# Patient Record
Sex: FEMALE | Race: WHITE | NOT HISPANIC OR LATINO | URBAN - METROPOLITAN AREA
[De-identification: names, ages, dates, MRNs, and addresses within clinical notes are randomized per-mention and may not be internally consistent; named-entity substitution may affect disease eponyms.]

---

## 2019-02-12 ENCOUNTER — TRANSFERRED RECORDS (OUTPATIENT)
Dept: MULTI SPECIALTY CLINIC | Facility: CLINIC | Age: 49
End: 2019-02-12

## 2024-03-05 ENCOUNTER — APPOINTMENT (OUTPATIENT)
Dept: ULTRASOUND IMAGING | Facility: CLINIC | Age: 54
End: 2024-03-05
Attending: EMERGENCY MEDICINE
Payer: COMMERCIAL

## 2024-03-05 ENCOUNTER — HOSPITAL ENCOUNTER (EMERGENCY)
Facility: CLINIC | Age: 54
Discharge: HOME | End: 2024-03-05
Attending: EMERGENCY MEDICINE | Admitting: EMERGENCY MEDICINE
Payer: COMMERCIAL

## 2024-03-05 ENCOUNTER — APPOINTMENT (OUTPATIENT)
Dept: RADIOLOGY | Facility: CLINIC | Age: 54
End: 2024-03-05
Attending: EMERGENCY MEDICINE
Payer: COMMERCIAL

## 2024-03-05 VITALS
SYSTOLIC BLOOD PRESSURE: 169 MMHG | DIASTOLIC BLOOD PRESSURE: 94 MMHG | TEMPERATURE: 97.2 F | OXYGEN SATURATION: 97 % | HEART RATE: 95 BPM | WEIGHT: 253 LBS | RESPIRATION RATE: 20 BRPM

## 2024-03-05 DIAGNOSIS — R07.89 RIGHT-SIDED CHEST WALL PAIN: ICD-10-CM

## 2024-03-05 LAB
ALBUMIN SERPL BCG-MCNC: 4.5 G/DL (ref 3.5–5.2)
ALP SERPL-CCNC: 91 U/L (ref 40–150)
ALT SERPL W P-5'-P-CCNC: 36 U/L (ref 0–50)
ANION GAP SERPL CALCULATED.3IONS-SCNC: 13 MMOL/L (ref 7–15)
AST SERPL W P-5'-P-CCNC: 22 U/L (ref 0–45)
ATRIAL RATE - MUSE: 78 BPM
BASOPHILS # BLD AUTO: 0.1 10E3/UL (ref 0–0.2)
BASOPHILS NFR BLD AUTO: 1 %
BILIRUB SERPL-MCNC: 0.3 MG/DL
BUN SERPL-MCNC: 14.4 MG/DL (ref 6–20)
CALCIUM SERPL-MCNC: 9.8 MG/DL (ref 8.6–10)
CHLORIDE SERPL-SCNC: 99 MMOL/L (ref 98–107)
CREAT SERPL-MCNC: 0.93 MG/DL (ref 0.51–0.95)
DEPRECATED HCO3 PLAS-SCNC: 26 MMOL/L (ref 22–29)
DIASTOLIC BLOOD PRESSURE - MUSE: NORMAL MMHG
EGFRCR SERPLBLD CKD-EPI 2021: 73 ML/MIN/1.73M2
EOSINOPHIL # BLD AUTO: 0.2 10E3/UL (ref 0–0.7)
EOSINOPHIL NFR BLD AUTO: 2 %
ERYTHROCYTE [DISTWIDTH] IN BLOOD BY AUTOMATED COUNT: 13.2 % (ref 10–15)
GLUCOSE SERPL-MCNC: 97 MG/DL (ref 70–99)
HCT VFR BLD AUTO: 40.6 % (ref 35–47)
HGB BLD-MCNC: 13.4 G/DL (ref 11.7–15.7)
IMM GRANULOCYTES # BLD: 0 10E3/UL
IMM GRANULOCYTES NFR BLD: 0 %
INTERPRETATION ECG - MUSE: NORMAL
LIPASE SERPL-CCNC: 42 U/L (ref 13–60)
LYMPHOCYTES # BLD AUTO: 2.8 10E3/UL (ref 0–5.3)
LYMPHOCYTES NFR BLD AUTO: 29 %
MCH RBC QN AUTO: 30.3 PG (ref 26.5–33)
MCHC RBC AUTO-ENTMCNC: 33 G/DL (ref 31.5–36.5)
MCV RBC AUTO: 92 FL (ref 78–100)
MONOCYTES # BLD AUTO: 0.6 10E3/UL (ref 0–1.3)
MONOCYTES NFR BLD AUTO: 6 %
NEUTROPHILS # BLD AUTO: 5.9 10E3/UL (ref 1.6–8.3)
NEUTROPHILS NFR BLD AUTO: 61 %
NRBC # BLD AUTO: 0 10E3/UL
NRBC BLD AUTO-RTO: 0 /100
P AXIS - MUSE: 14 DEGREES
PLATELET # BLD AUTO: 321 10E3/UL (ref 150–450)
POTASSIUM SERPL-SCNC: 4.6 MMOL/L (ref 3.4–5.3)
PR INTERVAL - MUSE: 172 MS
PROT SERPL-MCNC: 8.7 G/DL (ref 6.4–8.3)
QRS DURATION - MUSE: 80 MS
QT - MUSE: 370 MS
QTC - MUSE: 421 MS
R AXIS - MUSE: 12 DEGREES
RBC # BLD AUTO: 4.42 10E6/UL (ref 3.8–5.2)
SODIUM SERPL-SCNC: 138 MMOL/L (ref 135–145)
SYSTOLIC BLOOD PRESSURE - MUSE: NORMAL MMHG
T AXIS - MUSE: 32 DEGREES
VENTRICULAR RATE- MUSE: 78 BPM
WBC # BLD AUTO: 9.6 10E3/UL (ref 4–11)

## 2024-03-05 PROCEDURE — 96372 THER/PROPH/DIAG INJ SC/IM: CPT | Performed by: EMERGENCY MEDICINE

## 2024-03-05 PROCEDURE — 250N000011 HC RX IP 250 OP 636: Performed by: EMERGENCY MEDICINE

## 2024-03-05 PROCEDURE — 83690 ASSAY OF LIPASE: CPT | Performed by: EMERGENCY MEDICINE

## 2024-03-05 PROCEDURE — 80053 COMPREHEN METABOLIC PANEL: CPT | Performed by: EMERGENCY MEDICINE

## 2024-03-05 PROCEDURE — 76705 ECHO EXAM OF ABDOMEN: CPT

## 2024-03-05 PROCEDURE — 36415 COLL VENOUS BLD VENIPUNCTURE: CPT | Performed by: EMERGENCY MEDICINE

## 2024-03-05 PROCEDURE — 71101 X-RAY EXAM UNILAT RIBS/CHEST: CPT | Mod: RT

## 2024-03-05 PROCEDURE — 85025 COMPLETE CBC W/AUTO DIFF WBC: CPT | Performed by: EMERGENCY MEDICINE

## 2024-03-05 PROCEDURE — 93005 ELECTROCARDIOGRAM TRACING: CPT | Performed by: EMERGENCY MEDICINE

## 2024-03-05 PROCEDURE — 99285 EMERGENCY DEPT VISIT HI MDM: CPT | Mod: 25

## 2024-03-05 RX ORDER — KETOROLAC TROMETHAMINE 30 MG/ML
30 INJECTION, SOLUTION INTRAMUSCULAR; INTRAVENOUS ONCE
Status: COMPLETED | OUTPATIENT
Start: 2024-03-05 | End: 2024-03-05

## 2024-03-05 RX ADMIN — KETOROLAC TROMETHAMINE 30 MG: 30 INJECTION, SOLUTION INTRAMUSCULAR at 20:14

## 2024-03-05 ASSESSMENT — ENCOUNTER SYMPTOMS
FEVER: 0
NAUSEA: 0
VOMITING: 0
BLOOD IN STOOL: 0
BACK PAIN: 1
CHILLS: 0

## 2024-03-05 ASSESSMENT — COLUMBIA-SUICIDE SEVERITY RATING SCALE - C-SSRS
6. HAVE YOU EVER DONE ANYTHING, STARTED TO DO ANYTHING, OR PREPARED TO DO ANYTHING TO END YOUR LIFE?: NO
1. IN THE PAST MONTH, HAVE YOU WISHED YOU WERE DEAD OR WISHED YOU COULD GO TO SLEEP AND NOT WAKE UP?: NO
2. HAVE YOU ACTUALLY HAD ANY THOUGHTS OF KILLING YOURSELF IN THE PAST MONTH?: NO

## 2024-03-05 NOTE — ED PROVIDER NOTES
EMERGENCY DEPARTMENT ENCOUNTER      NAME: Mariela Doyle  AGE: 53 year old female  YOB: 1970  MRN: [id number]  EVALUATION DATE & TIME: No admission date for patient encounter.    PCP: No Ref-Primary, Physician    ED PROVIDER: Donta Gibbons DO      Chief Complaint   Patient presents with    Abdominal Pain         FINAL IMPRESSION:  1. Right-sided chest wall pain          ED COURSE & MEDICAL DECISION MAKIN-year-old female presented to the ED for evaluation right sided chest wall pain started in the middle of the night.  Upon arrival to the ED the patient was hypertensive.  She was otherwise hemodynamically stable.  The patient did not appear to be in any obvious distress or discomfort at the time of her initial evaluation.  On exam the patient's pain was clearly reproducible to palpation over the right anterolateral lower ribs.  The remainder of her physical exam was unremarkable.      CBC, CMP, and lipase were reassuring.  Right upper quadrant abdominal ultrasound was nondiagnostic.  EKG revealed normal sinus rhythm without any concerning ST or T wave changes.  Chest x-ray was nondiagnostic.    The patient was reevaluated and informed of the reassuring lab and imaging results.  The patient was informed that her symptoms are likely due to a musculoskeletal etiology such as costochondritis.  The patient was given a dose of Toradol here in the ED to treat her pain.  Following this the patient felt comfortable returning home.  The patient was instructed to continue using over-the-counter ibuprofen as needed for any further pain.  The patient was instructed to follow-up with her primary care provider for reevaluation or to return back to ED sooner for any worsening pain or any other new or concerning symptoms.    Pertinent Labs & Imaging studies reviewed. (See chart for details)  5:01 PM I met with the patient to gather history and to perform my initial exam. We discussed plans for the ED course,  including diagnostic testing and treatment.         At the conclusion of the encounter I discussed the results of all of the tests and the disposition. The questions were answered. The patient or family acknowledged understanding and was agreeable with the care plan.     Medical Decision Making  Obtained supplemental history:Supplemental history obtained?: Documented in chart  Reviewed external records: External records reviewed?: Documented in chart  Care impacted by chronic illness:Other: GERD  Care significantly affected by social determinants of health:N/A  Did you consider but not order tests?: Work up considered but not performed and documented in chart, if applicable  Did you interpret images independently?: Independent interpretation of ECG and images noted in documentation, when applicable.  Consultation discussion with other provider:Did you involve another provider (consultant, , pharmacy, etc.)?: No  Discharge. No recommendations on prescription strength medication(s). See documentation for any additional details.      PPE worn: n95 mask, goggles    MEDICATIONS GIVEN IN THE EMERGENCY:  Medications   ketorolac (TORADOL) injection 30 mg (30 mg Intramuscular $Given 3/5/24 2014)       NEW PRESCRIPTIONS STARTED AT TODAY'S ER VISIT  There are no discharge medications for this patient.         =================================================================    HPI    Patient information was obtained from: The patient    Use of : N/A         Mariela Doyle is a 53 year old female with a pertinent history of GERD who presents to this ED via walk-in for evaluation of abdominal pain.    The patient is complaining of sharp right sided rib pain that radiates to the right-side of her back for about a week now. Since onset, her pain has progressively worsened with today being the worst. She rates her pain a 7/10. She was able to eat a salad today around noon. She had a normal bowel movement this  morning. She endorses a headache.    She reports having a family history of colon cancer. She smokes marijuana and drinks alcohol occasionally.    Otherwise, the patient denied having nausea, vomiting, chills, fevers, blood in stool and any other medical complaints or concerns at this time.        REVIEW OF SYSTEMS   Review of Systems   Constitutional:  Negative for chills and fever.   Gastrointestinal:  Negative for blood in stool, nausea and vomiting.   Musculoskeletal:  Positive for back pain (right sided back pain).        Positive to right-sided rib pain   Psychiatric/Behavioral:  Positive for suicidal ideas.    All other systems reviewed and are negative.       PAST MEDICAL HISTORY:  History reviewed. No pertinent past medical history.    PAST SURGICAL HISTORY:  History reviewed. No pertinent surgical history.        CURRENT MEDICATIONS:    No current outpatient medications on file.      ALLERGIES:  No Known Allergies    FAMILY HISTORY:  No family history on file.    SOCIAL HISTORY:   Social History     Socioeconomic History    Marital status: Single     Spouse name: None    Number of children: None    Years of education: None    Highest education level: None     Social Determinants of Health     Financial Resource Strain: Low Risk  (1/22/2024)    Financial Resource Strain     Within the past 12 months, have you or your family members you live with been unable to get utilities (heat, electricity) when it was really needed?: No   Food Insecurity: Low Risk  (1/22/2024)    Food Insecurity     Within the past 12 months, did you worry that your food would run out before you got money to buy more?: No     Within the past 12 months, did the food you bought just not last and you didn t have money to get more?: No   Transportation Needs: Low Risk  (1/22/2024)    Transportation Needs     Within the past 12 months, has lack of transportation kept you from medical appointments, getting your medicines, non-medical meetings  or appointments, work, or from getting things that you need?: No   Housing Stability: Low Risk  (1/22/2024)    Housing Stability     Do you have housing? : Yes     Are you worried about losing your housing?: No       VITALS:  BP (!) 169/94   Pulse 95   Temp 97.2  F (36.2  C) (Temporal)   Resp 20   Wt 114.8 kg (253 lb)   SpO2 97%     PHYSICAL EXAM    General presentation: Alert, Vital signs reviewed. NAD  HENT: ENT inspection is normal. Oropharynx is moist and clear.   Eye: Pupils are equal and reactive to light. EOMI  Neck: The neck is supple, with full ROM, with no evidence of meningismus.  Pulmonary: Currently in no acute respiratory distress. Normal, non labored respirations, the lung sounds are normal with good equal air movement. Clear to auscultation bilaterally.   Circulatory: Regular rate and rhythm. Peripheral pulses are strong and equal. No murmurs, rubs, or gallops.   Abdominal: Mild-moderate tenderness upon palpation to right anterolateral lower ribs. No rigidity, guarding, or rebound. Bowel sounds normal.   Neurologic: Alert, oriented to person, place, and time. No motor deficit. No sensory deficit. Cranial nerves II through XII are intact.  Musculoskeletal: No extremity tenderness. Full range of motion in all extremities. No extremity edema.   Skin: Skin color is normal. No rash. Warm. Dry to touch.      LAB:  All pertinent labs reviewed and interpreted.  Results for orders placed or performed during the hospital encounter of 03/05/24   US Abdomen Limited    Impression    IMPRESSION:    1.  Fatty liver.    2.  Normal gallbladder. No biliary ductal dilation.   Ribs XR, unilat 3 views + PA chest, right    Impression    IMPRESSION:    Lungs are clear. No pleural effusions or pneumothorax. Normal pulmonary vascularity. Nonenlarged cardiac silhouette.    No displaced right rib fractures.   Result Value Ref Range    Lipase 42 13 - 60 U/L   Comprehensive metabolic panel   Result Value Ref Range    Sodium  138 135 - 145 mmol/L    Potassium 4.6 3.4 - 5.3 mmol/L    Carbon Dioxide (CO2) 26 22 - 29 mmol/L    Anion Gap 13 7 - 15 mmol/L    Urea Nitrogen 14.4 6.0 - 20.0 mg/dL    Creatinine 0.93 0.51 - 0.95 mg/dL    GFR Estimate 73 >60 mL/min/1.73m2    Calcium 9.8 8.6 - 10.0 mg/dL    Chloride 99 98 - 107 mmol/L    Glucose 97 70 - 99 mg/dL    Alkaline Phosphatase 91 40 - 150 U/L    AST 22 0 - 45 U/L    ALT 36 0 - 50 U/L    Protein Total 8.7 (H) 6.4 - 8.3 g/dL    Albumin 4.5 3.5 - 5.2 g/dL    Bilirubin Total 0.3 <=1.2 mg/dL   CBC with platelets and differential   Result Value Ref Range    WBC Count 9.6 4.0 - 11.0 10e3/uL    RBC Count 4.42 3.80 - 5.20 10e6/uL    Hemoglobin 13.4 11.7 - 15.7 g/dL    Hematocrit 40.6 35.0 - 47.0 %    MCV 92 78 - 100 fL    MCH 30.3 26.5 - 33.0 pg    MCHC 33.0 31.5 - 36.5 g/dL    RDW 13.2 10.0 - 15.0 %    Platelet Count 321 150 - 450 10e3/uL    % Neutrophils 61 %    % Lymphocytes 29 %    % Monocytes 6 %    % Eosinophils 2 %    % Basophils 1 %    % Immature Granulocytes 0 %    NRBCs per 100 WBC 0 <1 /100    Absolute Neutrophils 5.9 1.6 - 8.3 10e3/uL    Absolute Lymphocytes 2.8 0.0 - 5.3 10e3/uL    Absolute Monocytes 0.6 0.0 - 1.3 10e3/uL    Absolute Eosinophils 0.2 0.0 - 0.7 10e3/uL    Absolute Basophils 0.1 0.0 - 0.2 10e3/uL    Absolute Immature Granulocytes 0.0 <=0.4 10e3/uL    Absolute NRBCs 0.0 10e3/uL   ECG 12-LEAD WITH MUSE (LHE)   Result Value Ref Range    Systolic Blood Pressure  mmHg    Diastolic Blood Pressure  mmHg    Ventricular Rate 78 BPM    Atrial Rate 78 BPM    VT Interval 172 ms    QRS Duration 80 ms     ms    QTc 421 ms    P Axis 14 degrees    R AXIS 12 degrees    T Axis 32 degrees    Interpretation ECG       Sinus rhythm  Normal ECG  No previous ECGs available  Confirmed by SEE ED PROVIDER NOTE FOR, ECG INTERPRETATION ([id number]),  LO VOGEL ([id number]) on 3/5/2024 9:38:55 PM         RADIOLOGY:  Reviewed all pertinent imaging. Please see official radiology report.  Ribs XR,  unilat 3 views + PA chest, right   Final Result   IMPRESSION:      Lungs are clear. No pleural effusions or pneumothorax. Normal pulmonary vascularity. Nonenlarged cardiac silhouette.      No displaced right rib fractures.      US Abdomen Limited   Final Result   IMPRESSION:      1.  Fatty liver.      2.  Normal gallbladder. No biliary ductal dilation.          EKG:    Normal sinus rhythm.  Rate of 78.  Normal QRS.  Normal QT.  No ST or T wave changes.  No previous EKG available for comparison    I have independently reviewed and interpreted the EKG(s) documented above.      I, Charlie Viera , am serving as a scribe to document services personally performed by Donta Gibbons DO based on my observation and the provider's statements to me. I, Donta Gibbons, attest that Charlie Viera is acting in a scribe capacity, has observed my performance of the services and has documented them in accordance with my direction.    Donta Gibbons DO  Emergency Medicine  [facility] EMERGENCY ROOM  [facility] [facility] [id number] [phone]       Donta Gibbons DO  03/06/24 0001

## 2024-03-05 NOTE — ED TRIAGE NOTES
Pt c/o RUQ abdominal pain and wraps around to right mid back, started about 1 week ago and getting worse. Denies n/v, still has gallbladder. Reports chills and hot flashes last week unsure of fevers. Denies diarrhea. Pt denies urinary Sx.

## 2024-03-06 NOTE — DISCHARGE INSTRUCTIONS
The EKG, chest x-ray, abdominal ultrasound, and laboratory test all appear reassuring here today in the emergency department.  Your pain is likely due to a musculoskeletal cause.  You can continue to use over-the-counter ibuprofen 600 mg every 8 hours as needed for any further pain.  Follow-up with your primary care provider for reevaluation or return back to ED sooner for any worsening pain or any other new or concerning symptoms

## 2024-03-10 ENCOUNTER — HEALTH MAINTENANCE LETTER (OUTPATIENT)
Age: 54
End: 2024-03-10

## 2024-03-23 SDOH — HEALTH STABILITY: PHYSICAL HEALTH: ON AVERAGE, HOW MANY MINUTES DO YOU ENGAGE IN EXERCISE AT THIS LEVEL?: 0 MIN

## 2024-03-23 SDOH — HEALTH STABILITY: PHYSICAL HEALTH: ON AVERAGE, HOW MANY DAYS PER WEEK DO YOU ENGAGE IN MODERATE TO STRENUOUS EXERCISE (LIKE A BRISK WALK)?: 0 DAYS

## 2024-03-23 ASSESSMENT — SOCIAL DETERMINANTS OF HEALTH (SDOH): HOW OFTEN DO YOU GET TOGETHER WITH FRIENDS OR RELATIVES?: TWICE A WEEK

## 2024-03-23 NOTE — COMMUNITY RESOURCES LIST (ENGLISH)
March 23, 2024           YOUR PERSONALIZED LIST OF SERVICES & PROGRAMS           & RECREATION    Sports      [place] - Sports clubs and recreational activities - [place] [address] (Distance: 1.8 miles)  Language: English  Fee: Self pay, Sliding scale      [facility] [employer]  [address] [place] [address] (Distance: 4.8 miles)  Phone: [phone]  Language: English  Fee: Sliding scale, Self pay  Accessibility: Ada accessible, Blind accommodation, Deaf or hard of hearing, Translation services      [employer]FT[employer]ALL  Website: http://www.Doktorburada.com.Urban Mapping    Classes/Groups      of the [place] - Gym or workout facility - [place] [address] (Distance: 1.8 miles)  Language: English  Fee: Free, Self pay, Sliding scale  Accessibility: Ada accessible      of the [place] - [place] [address] (Distance: 1.8 miles)  Language: English  Fee: Free, Self pay, Sliding scale      [employer] - [facility]  Phone: [phone]  Email: contact@[employer]  Website: [email or web address]  Language: English  Hours: Mon 9:00 AM - 6:00 PM Tue 9:00 AM - 6:00 PM Wed 9:00 AM - 6:00 PM Thu 9:00 AM - 6:00 PM Fri 9:00 AM - 6:00 PM  Fee: Free               IMPORTANT NUMBERS & WEBSITES        Emergency Services  911  .   [employer] Way  211 http://211unitedway.org  .   Poison Control  (426) 714-3393 http://[place]poison.org http://wisconsinpoison.org  .     Suicide and Crisis Lifeline  988 http://988lifeline.org  .   Childhelp National Child Abuse Hotline  262.687.2979 http://Childhelphotline.org   .   National Sexual Assault Hotline  (277) 516-3230 (HOPE) http://Rainn.org   .     National Runaway Safeline  (681) 713-2232 (RUNAWAY) http://1800runaway.org  .   Pregnancy &  Postpartum Support  Call/text 532-060-8484  [place]: http://ppsupportmn.org  [place]: http://psichapters.com/wi  .   Substance Abuse National Helpline ([facility])  413-647-HELP (7408) http://Findtreatment.gov   .                DISCLAIMER: [employer]  does not endorse any service providers mentioned in this resource list. [employer] does not guarantee that the services mentioned in this resource list will be available to you or will improve your health or wellness.    [facility]

## 2024-03-28 ENCOUNTER — OFFICE VISIT (OUTPATIENT)
Dept: FAMILY MEDICINE | Facility: CLINIC | Age: 54
End: 2024-03-28
Payer: COMMERCIAL

## 2024-03-28 VITALS
DIASTOLIC BLOOD PRESSURE: 92 MMHG | HEART RATE: 78 BPM | WEIGHT: 246 LBS | SYSTOLIC BLOOD PRESSURE: 143 MMHG | TEMPERATURE: 98.3 F | RESPIRATION RATE: 16 BRPM | HEIGHT: 62 IN | BODY MASS INDEX: 45.27 KG/M2 | OXYGEN SATURATION: 97 %

## 2024-03-28 DIAGNOSIS — E66.01 CLASS 3 SEVERE OBESITY DUE TO EXCESS CALORIES WITHOUT SERIOUS COMORBIDITY WITH BODY MASS INDEX (BMI) OF 40.0 TO 44.9 IN ADULT (H): ICD-10-CM

## 2024-03-28 DIAGNOSIS — Z00.00 ROUTINE GENERAL MEDICAL EXAMINATION AT A HEALTH CARE FACILITY: Primary | ICD-10-CM

## 2024-03-28 DIAGNOSIS — Z13.220 SCREENING FOR LIPID DISORDERS: ICD-10-CM

## 2024-03-28 DIAGNOSIS — Z86.0100 HISTORY OF COLONIC POLYPS: ICD-10-CM

## 2024-03-28 DIAGNOSIS — R03.0 ELEVATED BLOOD PRESSURE READING IN OFFICE WITHOUT DIAGNOSIS OF HYPERTENSION: ICD-10-CM

## 2024-03-28 DIAGNOSIS — E66.813 CLASS 3 SEVERE OBESITY DUE TO EXCESS CALORIES WITHOUT SERIOUS COMORBIDITY WITH BODY MASS INDEX (BMI) OF 40.0 TO 44.9 IN ADULT (H): ICD-10-CM

## 2024-03-28 DIAGNOSIS — Z12.11 SCREEN FOR COLON CANCER: ICD-10-CM

## 2024-03-28 DIAGNOSIS — Z13.29 SCREENING FOR THYROID DISORDER: ICD-10-CM

## 2024-03-28 DIAGNOSIS — Z13.1 SCREENING FOR DIABETES MELLITUS: ICD-10-CM

## 2024-03-28 DIAGNOSIS — M17.11 PRIMARY OSTEOARTHRITIS OF RIGHT KNEE: ICD-10-CM

## 2024-03-28 DIAGNOSIS — N95.1 PERI-MENOPAUSAL: ICD-10-CM

## 2024-03-28 LAB — HBA1C MFR BLD: 5.7 % (ref 0–5.6)

## 2024-03-28 PROCEDURE — 83036 HEMOGLOBIN GLYCOSYLATED A1C: CPT

## 2024-03-28 PROCEDURE — 80061 LIPID PANEL: CPT

## 2024-03-28 PROCEDURE — 99213 OFFICE O/P EST LOW 20 MIN: CPT | Mod: 25

## 2024-03-28 PROCEDURE — 84443 ASSAY THYROID STIM HORMONE: CPT

## 2024-03-28 PROCEDURE — 83001 ASSAY OF GONADOTROPIN (FSH): CPT

## 2024-03-28 PROCEDURE — 99386 PREV VISIT NEW AGE 40-64: CPT

## 2024-03-28 PROCEDURE — 36415 COLL VENOUS BLD VENIPUNCTURE: CPT

## 2024-03-28 RX ORDER — VALACYCLOVIR HYDROCHLORIDE 1 G/1
1 TABLET, FILM COATED ORAL DAILY PRN
COMMUNITY
Start: 2023-12-26

## 2024-03-28 NOTE — ASSESSMENT & PLAN NOTE
Follows with [facility]. Scheduled for total knee replacement in May. She is currently in physical therapy (just started last Friday) and did not find success with multiple injections.  She will continue to participate in her treatment plan.  Regarding her poor activity tolerance and inability to stand for extended period of time, I am most suspicious of arthritis and pain being the cause behind this.  Wide-ranging lab workup was also obtained today but at this point in time, I would like her to pursue her treatment with orthopedics and if she continues to have symptoms that we can reevaluate.  On exam, strength is equal with no evidence of poor circulation.

## 2024-03-28 NOTE — PROGRESS NOTES
Preventive Care Visit  [facility]  BRITTANI Lewis CNP, Family Medicine  Mar 28, 2024      Assessment & Plan   Problem List Items Addressed This Visit       Elevated blood pressure reading in office without diagnosis of hypertension     Blood pressure today in clinic is slightly elevated at 143/92.  We discussed lifestyle modifications as a pertains to decreasing her blood pressure including increasing fruits and vegetables, decreasing alcohol use  and increasing physical activity as she tolerates with her orthopedic concerns.  I would recommend that we reassess her blood pressure in approximately a month.  If she remains persistently elevated, I would recommend initiation of an antihypertensive medication as well as an EKG for a new diagnosis of essential hypertension.  This could be temporary, as we are hopeful that weight reduction whether that is through medical weight management or surgical intervention will improve her blood pressure as well.         Osteoarthritis of right knee     Follows with [facility]. Scheduled for total knee replacement in May. She is currently in physical therapy (just started last Friday) and did not find success with multiple injections.  She will continue to participate in her treatment plan.  Regarding her poor activity tolerance and inability to stand for extended period of time, I am most suspicious of arthritis and pain being the cause behind this.  Wide-ranging lab workup was also obtained today but at this point in time, I would like her to pursue her treatment with orthopedics and if she continues to have symptoms that we can reevaluate.  On exam, strength is equal with no evidence of poor circulation.         Routine general medical examination at a health care facility - Primary     Annual exam.  New patient.  Mammogram: Up-to-date.  Colonoscopy: Ordered.  History of polyps.  Pap: Up-to-date.  Immunizations: Declines today.   Discussed.  Labs: Discussed and offered.  Mood: Stable.  BMI: As documented.  Discussed bone health.  No indication for early DEXA screening.  Declines STI screening.  Recommend follow-up in 1 year for annual exam or sooner if needed/indicated.         Class 3 severe obesity due to excess calories without serious comorbidity with body mass index (BMI) of 40.0 to 44.9 in adult (H)     BMI today is 44.99.  Weight likely exacerbated by physical activity due to pain in multiple joints.  We spent time discussing diet and exercise.  She just began physical therapy and biking, which I think will be helpful.  She can also explore pool therapy for some nonweightbearing exercises.  Diet is fairly well-rounded; reviewed the importance of protein, smaller more frequent meals throughout the day and avoiding food close to bedtime.  She does consume larger than ideal amount of alcohol on an intermittent basis, so this could be contributing as well to weight.  I have updated metabolic labs including hemoglobin A1c, lipids and a TSH given that she has a positive family history of both thyroid disease and diabetes but I cannot see that these have been checked.  She would be a good candidate for weight management.  Given her sisters diagnosis of thyroid cancer, she may not be a candidate for GLP-1 medications but I encouraged her to inquire on the specifics of her sisters diagnosis.  Bariatric surgery also remains a potential for her.  Referral placed to comprehensive weight management today for additional resources, but patient does plan to address her orthopedic concerns prior to this.         Relevant Orders    Adult Comprehensive Weight Management  Referral    History of colonic polyps     Due for colonoscopy.  Ordered today.          Other Visit Diagnoses       Screen for colon cancer        Relevant Orders    Colonoscopy Screening  Referral    Screening for lipid disorders        Relevant Orders    Lipid panel  "reflex to direct LDL Non-fasting    Willa-menopausal        Relevant Orders    Follicle stimulating hormone    Screening for thyroid disorder        Relevant Orders    TSH with free T4 reflex    Screening for diabetes mellitus        Relevant Orders    Hemoglobin A1c (Completed)           BMI  Estimated body mass index is 44.99 kg/m  as calculated from the following:    Height as of this encounter: 1.575 m (5' 2\").    Weight as of this encounter: 111.6 kg (246 lb).   Weight management plan: Patient referred to endocrine and/or weight management specialty Discussed healthy diet and exercise guidelines    Counseling  Appropriate preventive services were discussed with this patient, including applicable screening as appropriate for fall prevention, nutrition, physical activity, Tobacco-use cessation, weight loss and cognition.  Checklist reviewing preventive services available has been given to the patient.  Reviewed patient's diet, addressing concerns and/or questions.     Razia Sierra is a 53 year old, presenting for the following:  Physical (Pt. Nonfasting. PAP UTD.), Establish Care, Leg Problem (Unable to stand for long periods of time.), and Arthritis (Bilat elbow pain)        3/28/2024     3:37 PM   Additional Questions   Roomed by sac   Accompanied by self         3/28/2024     3:37 PM   Patient Reported Additional Medications   Patient reports taking the following new medications no        Health Care Directive  Patient does not have a Health Care Directive or Living Will: Discussed advance care planning with patient; information given to patient to review.    In addition to preventative medicine, patient is hoping to discuss to various joint pains.  She notes a history significant for severe osteoarthritis of the right knee.  She is currently under the care of [facility] orth[facility] and has a total knee replacement scheduled for May of this year.  She notes that she has other pains in bilateral elbows " and the left knee.  She is unable to stand for extended periods of time as her lower extremities will begin throbbing.  She denies any joint instability or falls.  No weakness but does feel as if her gait is altered.  She is also interested in discussing weight management.  She notes a lifelong struggle with weight and as she is getting ready to retire, she is hoping to improve her health.  She has been strongly considering bypass surgery but is also interested in discussing medications.  Due to her joint pain, she feels that she is quite physically inactive and believes that this has contributed significantly to her weight.    Arthritis          3/23/2024   General Health   How would you rate your overall physical health? (!) POOR   Feel stress (tense, anxious, or unable to sleep) Not at all         3/23/2024   Nutrition   Three or more servings of calcium each day? (!) NO   Diet: Regular (no restrictions)   How many servings of fruit and vegetables per day? (!) 2-3   How many sweetened beverages each day? 0-1         3/23/2024   Exercise   Days per week of moderate/strenous exercise 0 days   Average minutes spent exercising at this level 0 min   (!) EXERCISE CONCERN      3/23/2024   Social Factors   Frequency of gathering with friends or relatives Twice a week   Worry food won't last until get money to buy more No   Food not last or not have enough money for food? No   Do you have housing?  Yes   Are you worried about losing your housing? No   Lack of transportation? No   Unable to get utilities (heat,electricity)? No         3/28/2024   Fall Risk   Gait Speed Test (Document in seconds) 3   Gait Speed Test Interpretation Less than or equal to 5.00 seconds - PASS          3/23/2024   Dental   Dentist two times every year? Yes         3/23/2024   TB Screening   Were you born outside of the [place]? No     Today's PHQ-2 Score:       3/28/2024     2:53 PM   PHQ-2 ( 1999 [employer])   Q1: Little interest or pleasure in doing  "things 0   Q2: Feeling down, depressed or hopeless 0   PHQ-2 Score 0   Q1: Little interest or pleasure in doing things Not at all   Q2: Feeling down, depressed or hopeless Not at all   PHQ-2 Score 0         3/23/2024   Substance Use   Alcohol more than 3/day or more than 7/wk No   Do you use any other substances recreationally? No     Social History     Tobacco Use    Smoking status: Some Days     Types: Other    Smokeless tobacco: Never   Vaping Use    Vaping Use: Never used   Substance Use Topics    Alcohol use: Yes    Drug use: Never           1/22/2024   LAST FHS-7 RESULTS   1st degree relative breast or ovarian cancer No   Any relative bilateral breast cancer No   Any male have breast cancer No   Any ONE woman have BOTH breast AND ovarian cancer No   Any woman with breast cancer before 50yrs No   2 or more relatives with breast AND/OR ovarian cancer No   2 or more relatives with breast AND/OR bowel cancer Yes     Mammogram Screening - Mammogram every 1-2 years updated in Health Maintenance based on mutual decision making          3/23/2024   One time HIV Screening   Previous HIV test? No         3/23/2024   STI Screening   New sexual partner(s) since last STI/HIV test? No     History of abnormal Pap smear: NO - age 30-65 PAP every 5 years with negative HPV co-testing recommended       ASCVD Risk   The ASCVD Risk score (Wendi DK, et al., 2019) failed to calculate for the following reasons:    Cannot find a previous HDL lab    Cannot find a previous total cholesterol lab    Reviewed and updated as needed this visit by Provider    Allergies  Meds  Problems  Med Hx  Surg Hx  Fam Hx            Objective    Exam  BP (!) 143/92 (BP Location: Left arm, Patient Position: Sitting, Cuff Size: Adult Large)   Pulse 78   Temp 98.3  F (36.8  C) (Oral)   Resp 16   Ht 1.575 m (5' 2\")   Wt 111.6 kg (246 lb)   SpO2 97%   BMI 44.99 kg/m     Estimated body mass index is 44.99 kg/m  as calculated from the " "following:    Height as of this encounter: 1.575 m (5' 2\").    Weight as of this encounter: 111.6 kg (246 lb).    Physical Exam  GENERAL: alert and no distress  EYES: Eyes grossly normal to inspection, PERRL and conjunctivae and sclerae normal  HENT: ear canals and TM's normal, nose and mouth without ulcers or lesions  NECK: no adenopathy, no asymmetry, masses, or scars  RESP: lungs clear to auscultation - no rales, rhonchi or wheezes  CV: regular rate and rhythm, normal S1 S2, no S3 or S4, no murmur, click or rub, no peripheral edema  ABDOMEN: soft, nontender, no hepatosplenomegaly, no masses and bowel sounds normal  MS: no gross musculoskeletal defects noted, no edema. Altered gait secondary to pain.  SKIN: no suspicious lesions or rashes  NEURO: Normal strength and tone, mentation intact and speech normal  PSYCH: mentation appears normal, affect normal/bright    Signed Electronically by: BRITTANI Lewis CNP    "

## 2024-03-28 NOTE — ASSESSMENT & PLAN NOTE
Annual exam.  New patient.  Mammogram: Up-to-date.  Colonoscopy: Ordered.  History of polyps.  Pap: Up-to-date.  Immunizations: Declines today.  Discussed.  Labs: Discussed and offered.  Mood: Stable.  BMI: As documented.  Discussed bone health.  No indication for early DEXA screening.  Declines STI screening.  Recommend follow-up in 1 year for annual exam or sooner if needed/indicated.

## 2024-03-28 NOTE — ASSESSMENT & PLAN NOTE
BMI today is 44.99.  Weight likely exacerbated by physical activity due to pain in multiple joints.  We spent time discussing diet and exercise.  She just began physical therapy and biking, which I think will be helpful.  She can also explore pool therapy for some nonweightbearing exercises.  Diet is fairly well-rounded; reviewed the importance of protein, smaller more frequent meals throughout the day and avoiding food close to bedtime.  She does consume larger than ideal amount of alcohol on an intermittent basis, so this could be contributing as well to weight.  I have updated metabolic labs including hemoglobin A1c, lipids and a TSH given that she has a positive family history of both thyroid disease and diabetes but I cannot see that these have been checked.  She would be a good candidate for weight management.  Given her sisters diagnosis of thyroid cancer, she may not be a candidate for GLP-1 medications but I encouraged her to inquire on the specifics of her sisters diagnosis.  Bariatric surgery also remains a potential for her.  Referral placed to comprehensive weight management today for additional resources, but patient does plan to address her orthopedic concerns prior to this.

## 2024-03-28 NOTE — ASSESSMENT & PLAN NOTE
Blood pressure today in clinic is slightly elevated at 143/92.  We discussed lifestyle modifications as a pertains to decreasing her blood pressure including increasing fruits and vegetables, decreasing alcohol use  and increasing physical activity as she tolerates with her orthopedic concerns.  I would recommend that we reassess her blood pressure in approximately a month.  If she remains persistently elevated, I would recommend initiation of an antihypertensive medication as well as an EKG for a new diagnosis of essential hypertension.  This could be temporary, as we are hopeful that weight reduction whether that is through medical weight management or surgical intervention will improve her blood pressure as well.

## 2024-03-29 LAB
CHOLEST SERPL-MCNC: 183 MG/DL
FASTING STATUS PATIENT QL REPORTED: NO
FSH SERPL IRP2-ACNC: 110 MIU/ML
HDLC SERPL-MCNC: 56 MG/DL
LDLC SERPL CALC-MCNC: 101 MG/DL
NONHDLC SERPL-MCNC: 127 MG/DL
TRIGL SERPL-MCNC: 129 MG/DL
TSH SERPL DL<=0.005 MIU/L-ACNC: 2.97 UIU/ML (ref 0.3–4.2)

## 2024-04-01 ENCOUNTER — PATIENT OUTREACH (OUTPATIENT)
Dept: GASTROENTEROLOGY | Facility: CLINIC | Age: 54
End: 2024-04-01
Payer: COMMERCIAL

## 2024-04-02 ENCOUNTER — TELEPHONE (OUTPATIENT)
Dept: FAMILY MEDICINE | Facility: CLINIC | Age: 54
End: 2024-04-02
Payer: COMMERCIAL

## 2024-04-02 DIAGNOSIS — M17.11 PRIMARY OSTEOARTHRITIS OF RIGHT KNEE: Primary | ICD-10-CM

## 2024-04-02 NOTE — TELEPHONE ENCOUNTER
Order/Referral Request    Who is requesting: HIRO Toscano [phone]    Orders being requested: Insurance Referral to [facility]    Reason service is needed/diagnosis: Physical Therapy of Right knee    When are orders needed by: asap    Has this been discussed with Provider: Yes    Does patient have a preference on a Group/Provider/Facility? [facility]    Does patient have an appointment scheduled?: Yes: 05/02/24

## 2024-04-10 ENCOUNTER — TRANSFERRED RECORDS (OUTPATIENT)
Dept: HEALTH INFORMATION MANAGEMENT | Facility: CLINIC | Age: 54
End: 2024-04-10
Payer: COMMERCIAL

## 2024-05-02 ENCOUNTER — OFFICE VISIT (OUTPATIENT)
Dept: FAMILY MEDICINE | Facility: CLINIC | Age: 54
End: 2024-05-02
Payer: COMMERCIAL

## 2024-05-02 VITALS
BODY MASS INDEX: 44.2 KG/M2 | HEART RATE: 68 BPM | OXYGEN SATURATION: 99 % | SYSTOLIC BLOOD PRESSURE: 128 MMHG | HEIGHT: 62 IN | WEIGHT: 240.19 LBS | TEMPERATURE: 98 F | RESPIRATION RATE: 12 BRPM | DIASTOLIC BLOOD PRESSURE: 82 MMHG

## 2024-05-02 DIAGNOSIS — K21.9 GASTROESOPHAGEAL REFLUX DISEASE WITHOUT ESOPHAGITIS: ICD-10-CM

## 2024-05-02 DIAGNOSIS — E66.813 CLASS 3 SEVERE OBESITY DUE TO EXCESS CALORIES WITHOUT SERIOUS COMORBIDITY WITH BODY MASS INDEX (BMI) OF 40.0 TO 44.9 IN ADULT (H): ICD-10-CM

## 2024-05-02 DIAGNOSIS — E66.01 CLASS 3 SEVERE OBESITY DUE TO EXCESS CALORIES WITHOUT SERIOUS COMORBIDITY WITH BODY MASS INDEX (BMI) OF 40.0 TO 44.9 IN ADULT (H): ICD-10-CM

## 2024-05-02 DIAGNOSIS — M17.11 PRIMARY OSTEOARTHRITIS OF RIGHT KNEE: ICD-10-CM

## 2024-05-02 DIAGNOSIS — Z01.818 PREOP GENERAL PHYSICAL EXAM: Primary | ICD-10-CM

## 2024-05-02 PROBLEM — K64.9 HEMORRHOIDS: Status: ACTIVE | Noted: 2024-04-10

## 2024-05-02 PROBLEM — R03.0 ELEVATED BLOOD PRESSURE READING IN OFFICE WITHOUT DIAGNOSIS OF HYPERTENSION: Status: RESOLVED | Noted: 2019-01-02 | Resolved: 2024-05-02

## 2024-05-02 PROBLEM — D12.2 BENIGN NEOPLASM OF ASCENDING COLON: Status: ACTIVE | Noted: 2024-04-12

## 2024-05-02 LAB
ERYTHROCYTE [DISTWIDTH] IN BLOOD BY AUTOMATED COUNT: 12.8 % (ref 10–15)
HCT VFR BLD AUTO: 38.5 % (ref 35–47)
HGB BLD-MCNC: 12.5 G/DL (ref 11.7–15.7)
MCH RBC QN AUTO: 30 PG (ref 26.5–33)
MCHC RBC AUTO-ENTMCNC: 32.5 G/DL (ref 31.5–36.5)
MCV RBC AUTO: 93 FL (ref 78–100)
PLATELET # BLD AUTO: 296 10E3/UL (ref 150–450)
RBC # BLD AUTO: 4.16 10E6/UL (ref 3.8–5.2)
WBC # BLD AUTO: 9.7 10E3/UL (ref 4–11)

## 2024-05-02 PROCEDURE — 85027 COMPLETE CBC AUTOMATED: CPT

## 2024-05-02 PROCEDURE — G2211 COMPLEX E/M VISIT ADD ON: HCPCS

## 2024-05-02 PROCEDURE — 99214 OFFICE O/P EST MOD 30 MIN: CPT

## 2024-05-02 PROCEDURE — 36415 COLL VENOUS BLD VENIPUNCTURE: CPT

## 2024-05-02 NOTE — ASSESSMENT & PLAN NOTE
Patient is cleared for planned procedure as documented elsewhere.  She is aware that specific preoperative instruction and all postoperative instructions will come from her surgical team.  Today, we discussed basic n.p.o. recommendations and antiseptic bathing.  We reviewed medications, none of which require alterations prior to surgery.  Will update CBC, but she had a normal hemoglobin A1c done last month.  BMI is documented elsewhere.  All questions were answered at the conclusion of our visit.

## 2024-05-02 NOTE — ASSESSMENT & PLAN NOTE
Indication for surgery. Symptoms for over 10 years. Sub-optimal response to more conservative cares such as physical therapy and steroid injections.  Patient is pursuing surgical intervention for improvement in pain and function.  Specialty notes are unavailable at the time of our visit, but she is an excellent historian.

## 2024-05-02 NOTE — ASSESSMENT & PLAN NOTE
Patient notes today that over the last month, she has made some significant lifestyle changes including omitting alcohol, focusing on a healthy diet. As a result, she has lost a little over 6 pounds. She does have an appointment scheduled with comprehensive weight management later this summer.  I congratulated her on her efforts thus far and encouraged her to reach out with any additional needs.

## 2024-05-02 NOTE — PROGRESS NOTES
Preoperative Evaluation  [facility]  [address]  [facility] [address]  Phone: [phone]  Fax: [phone]  Primary Provider: Mara Arevalo  Pre-op Performing Provider: MARA AREVALO  May 2, 2024       Mariela is a 53 year old, presenting for the following:  Pre-Op Exam (RT knee replacement 05/22/24 with Dr. Guzman at [facility])        5/2/2024     3:58 PM   Additional Questions   Roomed by sac   Accompanied by self         5/2/2024     3:58 PM   Patient Reported Additional Medications   Patient reports taking the following new medications no     Surgical Information  Surgery/Procedure: RT knee replacement  Surgery Location: [facility]  Surgeon: Dr. Guzman  Surgery Date: 05/22/24  Time of Surgery: 1200PM  Where patient plans to recover: At home with family  Fax number for surgical facility: Note does not need to be faxed, will be available electronically in Epic.    Assessment & Plan     The proposed surgical procedure is considered INTERMEDIATE risk.    Problem List Items Addressed This Visit       GERD (gastroesophageal reflux disease)     Currently well controlled with omeprazole as needed. She is aware she can take this medication as prescribed prior to surgery.         Relevant Medications    omeprazole (PRILOSEC) 20 MG DR capsule    Osteoarthritis of right knee     Indication for surgery. Symptoms for over 10 years. Sub-optimal response to more conservative cares such as physical therapy and steroid injections.  Patient is pursuing surgical intervention for improvement in pain and function.  Specialty notes are unavailable at the time of our visit, but she is an excellent historian.         Class 3 severe obesity due to excess calories without serious comorbidity with body mass index (BMI) of 40.0 to 44.9 in adult (H)     Patient notes today that over the last month, she has made some significant lifestyle changes including omitting  alcohol, focusing on a healthy diet. As a result, she has lost a little over 6 pounds. She does have an appointment scheduled with comprehensive weight management later this summer.  I congratulated her on her efforts thus far and encouraged her to reach out with any additional needs.         Preop general physical exam - Primary     Patient is cleared for planned procedure as documented elsewhere.  She is aware that specific preoperative instruction and all postoperative instructions will come from her surgical team.  Today, we discussed basic n.p.o. recommendations and antiseptic bathing.  We reviewed medications, none of which require alterations prior to surgery.  Will update CBC, but she had a normal hemoglobin A1c done last month.  BMI is documented elsewhere.  All questions were answered at the conclusion of our visit.         Relevant Orders    CBC with platelets (Completed)               - No identified additional risk factors other than previously addressed    Antiplatelet or Anticoagulation Medication Instructions   - Patient is on no antiplatelet or anticoagulation medications.    Additional Medication Instructions  Patient is to take all scheduled medications on the day of surgery    Recommendation  APPROVAL GIVEN to proceed with proposed procedure, without further diagnostic evaluation.    Subjective       HPI related to upcoming procedure: Symptoms of right knee pain for over 10 years. Non-responsive to more conservative cares such as physical therapy and steroid injections.  Patient is pursuing surgical intervention for improvement in pain and function.         4/25/2024     3:23 PM   Preop Questions   1. Have you ever had a heart attack or stroke? No   2. Have you ever had surgery on your heart or blood vessels, such as a stent placement, a coronary artery bypass, or surgery on an artery in your head, neck, heart, or legs? No   3. Do you have chest pain with activity? No   4. Do you have a history  of  heart failure? No   5. Do you currently have a cold, bronchitis or symptoms of other infection? No   6. Do you have a cough, shortness of breath, or wheezing? No   7. Do you or anyone in your family have previous history of blood clots? YES - Sister had DVT (provoked by travel)   8. Do you or does anyone in your family have a serious bleeding problem such as prolonged bleeding following surgeries or cuts? No   9. Have you ever had problems with anemia or been told to take iron pills? No   10. Have you had any abnormal blood loss such as black, tarry or bloody stools, or abnormal vaginal bleeding? No   11. Have you ever had a blood transfusion? No   12. Are you willing to have a blood transfusion if it is medically needed before, during, or after your surgery? Yes   13. Have you or any of your relatives ever had problems with anesthesia? No   14. Do you have sleep apnea, excessive snoring or daytime drowsiness? No   15. Do you have any artifical heart valves or other implanted medical devices like a pacemaker, defibrillator, or continuous glucose monitor? No   16. Do you have artificial joints? No   17. Are you allergic to latex? No   18. Is there any chance that you may be pregnant? No       Health Care Directive  Patient does not have a Health Care Directive or Living Will: Discussed advance care planning with patient; however, patient declined at this time.    Preoperative Review of    reviewed - no record of controlled substances prescribed.    Patient Active Problem List    Diagnosis Date Noted    Preop general physical exam 05/02/2024     Priority: Medium    Benign neoplasm of ascending colon 04/12/2024     Priority: Medium    Hemorrhoids 04/10/2024     Priority: Medium    Routine general medical examination at a health care facility 03/28/2024     Priority: Medium    Class 3 severe obesity due to excess calories without serious comorbidity with body mass index (BMI) of 40.0 to 44.9 in adult (H)  "03/28/2024     Priority: Medium    History of colonic polyps 03/28/2024     Priority: Medium    Osteoarthritis of right knee 05/14/2014     Priority: Medium    GERD (gastroesophageal reflux disease) 10/31/2011     Priority: Medium      Past Medical History:   Diagnosis Date    Elevated blood pressure reading in office without diagnosis of hypertension 01/02/2019     Past Surgical History:   Procedure Laterality Date    ABDOMEN SURGERY      Pyloric stenosis    COLONOSCOPY      EYE SURGERY      Lasic     Current Outpatient Medications   Medication Sig Dispense Refill    omeprazole (PRILOSEC) 20 MG DR capsule Take 1 capsule (20 mg) by mouth daily 90 capsule 3    valACYclovir (VALTREX) 1000 mg tablet Take 1 tablet by mouth daily Prn cold sores         No Known Allergies     Social History     Tobacco Use    Smoking status: Former     Types: Other    Smokeless tobacco: Never   Substance Use Topics    Alcohol use: Yes     History   Drug Use Unknown         Review of Systems    Review of Systems  Constitutional, HEENT, cardiovascular, pulmonary, gi and gu systems are negative, except as otherwise noted.    Objective    /82 (BP Location: Left arm, Patient Position: Sitting, Cuff Size: Adult Large)   Pulse 68   Temp 98  F (36.7  C) (Oral)   Resp 12   Ht 1.575 m (5' 2\")   Wt 108.9 kg (240 lb 3 oz)   SpO2 99%   BMI 43.93 kg/m     Estimated body mass index is 43.93 kg/m  as calculated from the following:    Height as of this encounter: 1.575 m (5' 2\").    Weight as of this encounter: 108.9 kg (240 lb 3 oz).    Physical Exam  GENERAL: alert and no distress  EYES: Eyes grossly normal to inspection, PERRL and conjunctivae and sclerae normal  HENT: ear canals and TM's normal, nose and mouth without ulcers or lesions  NECK: no adenopathy, no asymmetry, masses, or scars  RESP: lungs clear to auscultation - no rales, rhonchi or wheezes  CV: regular rate and rhythm, normal S1 S2, no S3 or S4, no murmur, click or rub, no " peripheral edema  ABDOMEN: soft, nontender, no hepatosplenomegaly, no masses and bowel sounds normal  MS: no gross musculoskeletal defects noted, no edema.  Slightly antalgic gait secondary to discomfort.  SKIN: no suspicious lesions or rashes  NEURO: Normal strength and tone, mentation intact and speech normal  PSYCH: mentation appears normal, affect normal/bright    Recent Labs   Lab Test 03/28/24  1628 03/05/24  1806   HGB  --  13.4   PLT  --  321   NA  --  138   POTASSIUM  --  4.6   CR  --  0.93   A1C 5.7*  --         Diagnostics  Recent Results (from the past 24 hour(s))   CBC with platelets    Collection Time: 05/02/24  4:23 PM   Result Value Ref Range    WBC Count 9.7 4.0 - 11.0 10e3/uL    RBC Count 4.16 3.80 - 5.20 10e6/uL    Hemoglobin 12.5 11.7 - 15.7 g/dL    Hematocrit 38.5 35.0 - 47.0 %    MCV 93 78 - 100 fL    MCH 30.0 26.5 - 33.0 pg    MCHC 32.5 31.5 - 36.5 g/dL    RDW 12.8 10.0 - 15.0 %    Platelet Count 296 150 - 450 10e3/uL      No EKG required, no history of coronary heart disease, significant arrhythmia, peripheral arterial disease or other structural heart disease.    Revised Cardiac Risk Index (RCRI)  The patient has the following serious cardiovascular risks for perioperative complications:   - No serious cardiac risks = 0 points     RCRI Interpretation: 0 points: Class I (very low risk - 0.4% complication rate)         Signed Electronically by: BRITTANI Lewis CNP  Copy of this evaluation report is provided to requesting physician.

## 2024-05-02 NOTE — PATIENT INSTRUCTIONS
Preparing for Your Surgery  Getting started  A nurse will call you to review your health history and instructions. They will give you an arrival time based on your scheduled surgery time. Please be ready to share:  Your doctor's clinic name and phone number  Your medical, surgical, and anesthesia history  A list of allergies and sensitivities  A list of medicines, including herbal treatments and over-the-counter drugs  Whether the patient has a legal guardian (ask how to send us the papers in advance)  Please tell us if you're pregnant--or if there's any chance you might be pregnant. Some surgeries may injure a fetus (unborn baby), so they require a pregnancy test. Surgeries that are safe for a fetus don't always need a test, and you can choose whether to have one.   If you have a child who's having surgery, please ask for a copy of Preparing for Your Child's Surgery.    Preparing for surgery  Within 10 to 30 days of surgery: Have a pre-op exam (sometimes called an H&P, or History and Physical). This can be done at a clinic or pre-operative center.  If you're having a , you may not need this exam. Talk to your care team.  At your pre-op exam, talk to your care team about all medicines you take. If you need to stop any medicines before surgery, ask when to start taking them again.  We do this for your safety. Many medicines can make you bleed too much during surgery. Some change how well surgery (anesthesia) drugs work.  Call your insurance company to let them know you're having surgery. (If you don't have insurance, call [phone].)  Call your clinic if there's any change in your health. This includes signs of a cold or flu (sore throat, runny nose, cough, rash, fever). It also includes a scrape or scratch near the surgery site.  If you have questions on the day of surgery, call your hospital or surgery center.  Eating and drinking guidelines  For your safety: Unless your surgeon tells you otherwise,  follow the guidelines below.  Eat and drink as usual until 8 hours before you arrive for surgery. After that, no food or milk.  Drink clear liquids until 2 hours before you arrive. These are liquids you can see through, like water, Gatorade, and Propel Water. They also include plain black coffee and tea (no cream or milk), candy, and breath mints. You can spit out gum when you arrive.  If you drink alcohol: Stop drinking it the night before surgery.  If your care team tells you to take medicine on the morning of surgery, it's okay to take it with a sip of water.  Preventing infection  Shower or bathe the night before and morning of your surgery. Follow the instructions your clinic gave you. (If no instructions, use regular soap.)  Don't shave or clip hair near your surgery site. We'll remove the hair if needed.  Don't smoke or vape the morning of surgery. You may chew nicotine gum up to 2 hours before surgery. A nicotine patch is okay.  Note: Some surgeries require you to completely quit smoking and nicotine. Check with your surgeon.  Your care team will make every effort to keep you safe from infection. We will:  Clean our hands often with soap and water (or an alcohol-based hand rub).  Clean the skin at your surgery site with a special soap that kills germs.  Give you a special gown to keep you warm. (Cold raises the risk of infection.)  Wear special hair covers, masks, gowns and gloves during surgery.  Give antibiotic medicine, if prescribed. Not all surgeries need antibiotics.  What to bring on the day of surgery  Photo ID and insurance card  Copy of your health care directive, if you have one  Glasses and hearing aids (bring cases)  You can't wear contacts during surgery  Inhaler and eye drops, if you use them (tell us about these when you arrive)  CPAP machine or breathing device, if you use them  A few personal items, if spending the night  If you have . . .  A pacemaker, ICD (cardiac defibrillator) or other  implant: Bring the ID card.  An implanted stimulator: Bring the remote control.  A legal guardian: Bring a copy of the certified (court-stamped) guardianship papers.  Please remove any jewelry, including body piercings. Leave jewelry and other valuables at home.  If you're going home the day of surgery  You must have a responsible adult drive you home. They should stay with you overnight as well.  If you don't have someone to stay with you, and you aren't safe to go home alone, we may keep you overnight. Insurance often won't pay for this.  After surgery  If it's hard to control your pain or you need more pain medicine, please call your surgeon's office.  Questions?   If you have any questions for your care team, list them here: _________________________________________________________________________________________________________________________________________________________________________ ____________________________________ ____________________________________ ____________________________________  For informational purposes only. Not to replace the advice of your health care provider. Copyright   2003, 2019 [employer] [facility]. All rights reserved. Clinically reviewed by Jordana Valdes MD. [employer] 906049 - REV 12/22.    How to Take Your Medication Before Surgery  - Take all of your medications before surgery as usual

## 2024-05-02 NOTE — H&P (VIEW-ONLY)
Preoperative Evaluation  [facility]  [address]  [facility] [address]  Phone: [phone]  Fax: [phone]  Primary Provider: Mara Arevalo  Pre-op Performing Provider: MARA AREVALO  May 2, 2024       Mariela is a 53 year old, presenting for the following:  Pre-Op Exam (RT knee replacement 05/22/24 with Dr. Guzman at [facility])        5/2/2024     3:58 PM   Additional Questions   Roomed by sac   Accompanied by self         5/2/2024     3:58 PM   Patient Reported Additional Medications   Patient reports taking the following new medications no     Surgical Information  Surgery/Procedure: RT knee replacement  Surgery Location: [facility]  Surgeon: Dr. Guzman  Surgery Date: 05/22/24  Time of Surgery: 1200PM  Where patient plans to recover: At home with family  Fax number for surgical facility: Note does not need to be faxed, will be available electronically in Epic.    Assessment & Plan     The proposed surgical procedure is considered INTERMEDIATE risk.    Problem List Items Addressed This Visit       GERD (gastroesophageal reflux disease)     Currently well controlled with omeprazole as needed. She is aware she can take this medication as prescribed prior to surgery.         Relevant Medications    omeprazole (PRILOSEC) 20 MG DR capsule    Osteoarthritis of right knee     Indication for surgery. Symptoms for over 10 years. Sub-optimal response to more conservative cares such as physical therapy and steroid injections.  Patient is pursuing surgical intervention for improvement in pain and function.  Specialty notes are unavailable at the time of our visit, but she is an excellent historian.         Class 3 severe obesity due to excess calories without serious comorbidity with body mass index (BMI) of 40.0 to 44.9 in adult (H)     Patient notes today that over the last month, she has made some significant lifestyle changes including omitting  alcohol, focusing on a healthy diet. As a result, she has lost a little over 6 pounds. She does have an appointment scheduled with comprehensive weight management later this summer.  I congratulated her on her efforts thus far and encouraged her to reach out with any additional needs.         Preop general physical exam - Primary     Patient is cleared for planned procedure as documented elsewhere.  She is aware that specific preoperative instruction and all postoperative instructions will come from her surgical team.  Today, we discussed basic n.p.o. recommendations and antiseptic bathing.  We reviewed medications, none of which require alterations prior to surgery.  Will update CBC, but she had a normal hemoglobin A1c done last month.  BMI is documented elsewhere.  All questions were answered at the conclusion of our visit.         Relevant Orders    CBC with platelets (Completed)               - No identified additional risk factors other than previously addressed    Antiplatelet or Anticoagulation Medication Instructions   - Patient is on no antiplatelet or anticoagulation medications.    Additional Medication Instructions  Patient is to take all scheduled medications on the day of surgery    Recommendation  APPROVAL GIVEN to proceed with proposed procedure, without further diagnostic evaluation.    Subjective       HPI related to upcoming procedure: Symptoms of right knee pain for over 10 years. Non-responsive to more conservative cares such as physical therapy and steroid injections.  Patient is pursuing surgical intervention for improvement in pain and function.         4/25/2024     3:23 PM   Preop Questions   1. Have you ever had a heart attack or stroke? No   2. Have you ever had surgery on your heart or blood vessels, such as a stent placement, a coronary artery bypass, or surgery on an artery in your head, neck, heart, or legs? No   3. Do you have chest pain with activity? No   4. Do you have a history  of  heart failure? No   5. Do you currently have a cold, bronchitis or symptoms of other infection? No   6. Do you have a cough, shortness of breath, or wheezing? No   7. Do you or anyone in your family have previous history of blood clots? YES - Sister had DVT (provoked by travel)   8. Do you or does anyone in your family have a serious bleeding problem such as prolonged bleeding following surgeries or cuts? No   9. Have you ever had problems with anemia or been told to take iron pills? No   10. Have you had any abnormal blood loss such as black, tarry or bloody stools, or abnormal vaginal bleeding? No   11. Have you ever had a blood transfusion? No   12. Are you willing to have a blood transfusion if it is medically needed before, during, or after your surgery? Yes   13. Have you or any of your relatives ever had problems with anesthesia? No   14. Do you have sleep apnea, excessive snoring or daytime drowsiness? No   15. Do you have any artifical heart valves or other implanted medical devices like a pacemaker, defibrillator, or continuous glucose monitor? No   16. Do you have artificial joints? No   17. Are you allergic to latex? No   18. Is there any chance that you may be pregnant? No       Health Care Directive  Patient does not have a Health Care Directive or Living Will: Discussed advance care planning with patient; however, patient declined at this time.    Preoperative Review of    reviewed - no record of controlled substances prescribed.    Patient Active Problem List    Diagnosis Date Noted    Preop general physical exam 05/02/2024     Priority: Medium    Benign neoplasm of ascending colon 04/12/2024     Priority: Medium    Hemorrhoids 04/10/2024     Priority: Medium    Routine general medical examination at a health care facility 03/28/2024     Priority: Medium    Class 3 severe obesity due to excess calories without serious comorbidity with body mass index (BMI) of 40.0 to 44.9 in adult (H)  "03/28/2024     Priority: Medium    History of colonic polyps 03/28/2024     Priority: Medium    Osteoarthritis of right knee 05/14/2014     Priority: Medium    GERD (gastroesophageal reflux disease) 10/31/2011     Priority: Medium      Past Medical History:   Diagnosis Date    Elevated blood pressure reading in office without diagnosis of hypertension 01/02/2019     Past Surgical History:   Procedure Laterality Date    ABDOMEN SURGERY      Pyloric stenosis    COLONOSCOPY      EYE SURGERY      Lasic     Current Outpatient Medications   Medication Sig Dispense Refill    omeprazole (PRILOSEC) 20 MG DR capsule Take 1 capsule (20 mg) by mouth daily 90 capsule 3    valACYclovir (VALTREX) 1000 mg tablet Take 1 tablet by mouth daily Prn cold sores         No Known Allergies     Social History     Tobacco Use    Smoking status: Former     Types: Other    Smokeless tobacco: Never   Substance Use Topics    Alcohol use: Yes     History   Drug Use Unknown         Review of Systems    Review of Systems  Constitutional, HEENT, cardiovascular, pulmonary, gi and gu systems are negative, except as otherwise noted.    Objective    /82 (BP Location: Left arm, Patient Position: Sitting, Cuff Size: Adult Large)   Pulse 68   Temp 98  F (36.7  C) (Oral)   Resp 12   Ht 1.575 m (5' 2\")   Wt 108.9 kg (240 lb 3 oz)   SpO2 99%   BMI 43.93 kg/m     Estimated body mass index is 43.93 kg/m  as calculated from the following:    Height as of this encounter: 1.575 m (5' 2\").    Weight as of this encounter: 108.9 kg (240 lb 3 oz).    Physical Exam  GENERAL: alert and no distress  EYES: Eyes grossly normal to inspection, PERRL and conjunctivae and sclerae normal  HENT: ear canals and TM's normal, nose and mouth without ulcers or lesions  NECK: no adenopathy, no asymmetry, masses, or scars  RESP: lungs clear to auscultation - no rales, rhonchi or wheezes  CV: regular rate and rhythm, normal S1 S2, no S3 or S4, no murmur, click or rub, no " peripheral edema  ABDOMEN: soft, nontender, no hepatosplenomegaly, no masses and bowel sounds normal  MS: no gross musculoskeletal defects noted, no edema.  Slightly antalgic gait secondary to discomfort.  SKIN: no suspicious lesions or rashes  NEURO: Normal strength and tone, mentation intact and speech normal  PSYCH: mentation appears normal, affect normal/bright    Recent Labs   Lab Test 03/28/24  1628 03/05/24  1806   HGB  --  13.4   PLT  --  321   NA  --  138   POTASSIUM  --  4.6   CR  --  0.93   A1C 5.7*  --         Diagnostics  Recent Results (from the past 24 hour(s))   CBC with platelets    Collection Time: 05/02/24  4:23 PM   Result Value Ref Range    WBC Count 9.7 4.0 - 11.0 10e3/uL    RBC Count 4.16 3.80 - 5.20 10e6/uL    Hemoglobin 12.5 11.7 - 15.7 g/dL    Hematocrit 38.5 35.0 - 47.0 %    MCV 93 78 - 100 fL    MCH 30.0 26.5 - 33.0 pg    MCHC 32.5 31.5 - 36.5 g/dL    RDW 12.8 10.0 - 15.0 %    Platelet Count 296 150 - 450 10e3/uL      No EKG required, no history of coronary heart disease, significant arrhythmia, peripheral arterial disease or other structural heart disease.    Revised Cardiac Risk Index (RCRI)  The patient has the following serious cardiovascular risks for perioperative complications:   - No serious cardiac risks = 0 points     RCRI Interpretation: 0 points: Class I (very low risk - 0.4% complication rate)         Signed Electronically by: BRITTANI Lewis CNP  Copy of this evaluation report is provided to requesting physician.

## 2024-05-02 NOTE — ASSESSMENT & PLAN NOTE
Currently well controlled with omeprazole as needed. She is aware she can take this medication as prescribed prior to surgery.

## 2024-05-14 RX ORDER — ACETAMINOPHEN 500 MG
500-1000 TABLET ORAL EVERY 6 HOURS PRN
COMMUNITY
End: 2024-07-05

## 2024-05-14 NOTE — PROGRESS NOTES
Planning to discharge home on POD 1 in the morning with her mom staying with her.       05/14/24 4297   Discharge Planning   Patient/Family Anticipates Transition to home with family  (Pt. will arrange outpatient PT at [facility])   Concerns to be Addressed all concerns addressed in this encounter   Living Arrangements   People in Home alone   Type of Residence Private Residence   Is your private residence a single family home or apartment? Apartment   Number of Stairs, Within Home, Primary none   Once home, are you able to live on one level? Yes   Which level? Main Level   Bathroom Shower/Tub Tub/Shower unit   Equipment Currently Used at Home raised toilet seat  (Has a 4 wheeled walker, cane, and crutches at home.)   Support System   Support Systems Parent  (mom, Steffany, will stay with her after surgery)   Do you have someone available to stay with you one or two nights once you are home? Yes   Education   Patient attended total joint pre-op class/received pre-op teaching  email/phone call

## 2024-05-22 ENCOUNTER — APPOINTMENT (OUTPATIENT)
Dept: RADIOLOGY | Facility: CLINIC | Age: 54
End: 2024-05-22
Attending: ORTHOPAEDIC SURGERY
Payer: COMMERCIAL

## 2024-05-22 ENCOUNTER — ANESTHESIA (OUTPATIENT)
Dept: SURGERY | Facility: CLINIC | Age: 54
End: 2024-05-22
Payer: COMMERCIAL

## 2024-05-22 ENCOUNTER — HOSPITAL ENCOUNTER (OUTPATIENT)
Facility: CLINIC | Age: 54
Discharge: HOME | End: 2024-05-23
Attending: ORTHOPAEDIC SURGERY | Admitting: ORTHOPAEDIC SURGERY
Payer: COMMERCIAL

## 2024-05-22 ENCOUNTER — ANESTHESIA EVENT (OUTPATIENT)
Dept: SURGERY | Facility: CLINIC | Age: 54
End: 2024-05-22
Payer: COMMERCIAL

## 2024-05-22 DIAGNOSIS — Z96.651 HX OF TOTAL KNEE REPLACEMENT, RIGHT: Primary | ICD-10-CM

## 2024-05-22 PROBLEM — Z96.652 HISTORY OF TOTAL KNEE ARTHROPLASTY, LEFT: Status: ACTIVE | Noted: 2024-05-22

## 2024-05-22 LAB — LACTATE SERPL-SCNC: 0.8 MMOL/L (ref 0.7–2)

## 2024-05-22 PROCEDURE — 83605 ASSAY OF LACTIC ACID: CPT | Performed by: ORTHOPAEDIC SURGERY

## 2024-05-22 PROCEDURE — 370N000017 HC ANESTHESIA TECHNICAL FEE, PER MIN: Performed by: ORTHOPAEDIC SURGERY

## 2024-05-22 PROCEDURE — 250N000011 HC RX IP 250 OP 636: Performed by: ORTHOPAEDIC SURGERY

## 2024-05-22 PROCEDURE — 999N000065 XR KNEE PORT RIGHT 1/2 VIEWS: Mod: RT

## 2024-05-22 PROCEDURE — 250N000013 HC RX MED GY IP 250 OP 250 PS 637: Performed by: ORTHOPAEDIC SURGERY

## 2024-05-22 PROCEDURE — 258N000003 HC RX IP 258 OP 636: Performed by: ANESTHESIOLOGY

## 2024-05-22 PROCEDURE — 250N000011 HC RX IP 250 OP 636: Performed by: ANESTHESIOLOGY

## 2024-05-22 PROCEDURE — 258N000003 HC RX IP 258 OP 636: Performed by: REGISTERED NURSE

## 2024-05-22 PROCEDURE — 36415 COLL VENOUS BLD VENIPUNCTURE: CPT | Performed by: ORTHOPAEDIC SURGERY

## 2024-05-22 PROCEDURE — 250N000009 HC RX 250: Performed by: PHYSICIAN ASSISTANT

## 2024-05-22 PROCEDURE — 250N000011 HC RX IP 250 OP 636: Performed by: PHYSICIAN ASSISTANT

## 2024-05-22 PROCEDURE — 999N000141 HC STATISTIC PRE-PROCEDURE NURSING ASSESSMENT: Performed by: ORTHOPAEDIC SURGERY

## 2024-05-22 PROCEDURE — 272N000001 HC OR GENERAL SUPPLY STERILE: Performed by: ORTHOPAEDIC SURGERY

## 2024-05-22 PROCEDURE — 258N000001 HC RX 258: Performed by: ORTHOPAEDIC SURGERY

## 2024-05-22 PROCEDURE — C1713 ANCHOR/SCREW BN/BN,TIS/BN: HCPCS | Performed by: ORTHOPAEDIC SURGERY

## 2024-05-22 PROCEDURE — C1776 JOINT DEVICE (IMPLANTABLE): HCPCS | Performed by: ORTHOPAEDIC SURGERY

## 2024-05-22 PROCEDURE — 250N000009 HC RX 250: Performed by: REGISTERED NURSE

## 2024-05-22 PROCEDURE — 710N000010 HC RECOVERY PHASE 1, LEVEL 2, PER MIN: Performed by: ORTHOPAEDIC SURGERY

## 2024-05-22 PROCEDURE — 258N000003 HC RX IP 258 OP 636: Performed by: ORTHOPAEDIC SURGERY

## 2024-05-22 PROCEDURE — 250N000011 HC RX IP 250 OP 636: Performed by: REGISTERED NURSE

## 2024-05-22 PROCEDURE — 360N000077 HC SURGERY LEVEL 4, PER MIN: Performed by: ORTHOPAEDIC SURGERY

## 2024-05-22 PROCEDURE — 250N000013 HC RX MED GY IP 250 OP 250 PS 637: Performed by: PHYSICIAN ASSISTANT

## 2024-05-22 DEVICE — SIMPLEX® HV IS A FAST-SETTING ACRYLIC RESIN FOR USE IN BONE SURGERY. MIXING THE TWO SEPARATE STERILE COMPONENTS PRODUCES A DUCTILE BONE CEMENT WHICH, AFTER HARDENING, FIXES THE IMPLANT AND TRANSFERS STRESSES PRODUCED DURING MOVEMENT EVENLY TO THE BONE. SIMPLEX® HV CEMENT POWDER ALSO CONTAINS INSOLUBLE ZIRCONIUM DIOXIDE AS AN X-RAY CONTRAST MEDIUM. SIMPLEX® HV DOES NOT EMIT A SIGNAL AND DOES NOT POSE A SAFETY RISK IN A MAGNETIC RESONANCE ENVIRONMENT.
Type: IMPLANTABLE DEVICE | Site: KNEE | Status: FUNCTIONAL
Brand: SIMPLEX HV

## 2024-05-22 DEVICE — IMPLANTABLE DEVICE
Type: IMPLANTABLE DEVICE | Site: KNEE | Status: FUNCTIONAL
Brand: PERSONA® NATURAL TIBIA®

## 2024-05-22 DEVICE — IMPLANTABLE DEVICE
Type: IMPLANTABLE DEVICE | Site: KNEE | Status: FUNCTIONAL
Brand: PERSONA®

## 2024-05-22 DEVICE — IMPLANTABLE DEVICE
Type: IMPLANTABLE DEVICE | Site: KNEE | Status: FUNCTIONAL
Brand: PERSONA® VIVACIT-E®

## 2024-05-22 RX ORDER — SODIUM CHLORIDE, SODIUM LACTATE, POTASSIUM CHLORIDE, CALCIUM CHLORIDE 600; 310; 30; 20 MG/100ML; MG/100ML; MG/100ML; MG/100ML
INJECTION, SOLUTION INTRAVENOUS CONTINUOUS
Status: DISCONTINUED | OUTPATIENT
Start: 2024-05-22 | End: 2024-05-22 | Stop reason: HOSPADM

## 2024-05-22 RX ORDER — ONDANSETRON 4 MG/1
4 TABLET, ORALLY DISINTEGRATING ORAL EVERY 30 MIN PRN
Status: DISCONTINUED | OUTPATIENT
Start: 2024-05-22 | End: 2024-05-22 | Stop reason: HOSPADM

## 2024-05-22 RX ORDER — PROPOFOL 10 MG/ML
INJECTION, EMULSION INTRAVENOUS PRN
Status: DISCONTINUED | OUTPATIENT
Start: 2024-05-22 | End: 2024-05-22

## 2024-05-22 RX ORDER — ONDANSETRON 2 MG/ML
4 INJECTION INTRAMUSCULAR; INTRAVENOUS EVERY 6 HOURS PRN
Status: DISCONTINUED | OUTPATIENT
Start: 2024-05-22 | End: 2024-05-23 | Stop reason: HOSPADM

## 2024-05-22 RX ORDER — CEFAZOLIN SODIUM 2 G/100ML
2 INJECTION, SOLUTION INTRAVENOUS EVERY 8 HOURS
Qty: 200 ML | Refills: 0 | Status: COMPLETED | OUTPATIENT
Start: 2024-05-22 | End: 2024-05-23

## 2024-05-22 RX ORDER — FENTANYL CITRATE 50 UG/ML
25 INJECTION, SOLUTION INTRAMUSCULAR; INTRAVENOUS EVERY 5 MIN PRN
Status: DISCONTINUED | OUTPATIENT
Start: 2024-05-22 | End: 2024-05-22 | Stop reason: HOSPADM

## 2024-05-22 RX ORDER — AMOXICILLIN 250 MG
1 CAPSULE ORAL 2 TIMES DAILY
Status: DISCONTINUED | OUTPATIENT
Start: 2024-05-22 | End: 2024-05-23 | Stop reason: HOSPADM

## 2024-05-22 RX ORDER — POLYETHYLENE GLYCOL 3350 17 G/17G
17 POWDER, FOR SOLUTION ORAL DAILY
Status: DISCONTINUED | OUTPATIENT
Start: 2024-05-23 | End: 2024-05-23 | Stop reason: HOSPADM

## 2024-05-22 RX ORDER — LIDOCAINE 40 MG/G
CREAM TOPICAL
Status: DISCONTINUED | OUTPATIENT
Start: 2024-05-22 | End: 2024-05-22 | Stop reason: HOSPADM

## 2024-05-22 RX ORDER — HYDROMORPHONE HCL IN WATER/PF 6 MG/30 ML
0.2 PATIENT CONTROLLED ANALGESIA SYRINGE INTRAVENOUS EVERY 5 MIN PRN
Status: DISCONTINUED | OUTPATIENT
Start: 2024-05-22 | End: 2024-05-22 | Stop reason: HOSPADM

## 2024-05-22 RX ORDER — NALOXONE HYDROCHLORIDE 0.4 MG/ML
0.4 INJECTION, SOLUTION INTRAMUSCULAR; INTRAVENOUS; SUBCUTANEOUS
Status: DISCONTINUED | OUTPATIENT
Start: 2024-05-22 | End: 2024-05-23 | Stop reason: HOSPADM

## 2024-05-22 RX ORDER — TRANEXAMIC ACID 650 MG/1
1950 TABLET ORAL ONCE
Status: COMPLETED | OUTPATIENT
Start: 2024-05-22 | End: 2024-05-22

## 2024-05-22 RX ORDER — FENTANYL CITRATE 50 UG/ML
50 INJECTION, SOLUTION INTRAMUSCULAR; INTRAVENOUS EVERY 5 MIN PRN
Status: DISCONTINUED | OUTPATIENT
Start: 2024-05-22 | End: 2024-05-22 | Stop reason: HOSPADM

## 2024-05-22 RX ORDER — OXYCODONE HYDROCHLORIDE 5 MG/1
10 TABLET ORAL EVERY 4 HOURS PRN
Status: DISCONTINUED | OUTPATIENT
Start: 2024-05-22 | End: 2024-05-23 | Stop reason: HOSPADM

## 2024-05-22 RX ORDER — NALOXONE HYDROCHLORIDE 0.4 MG/ML
0.2 INJECTION, SOLUTION INTRAMUSCULAR; INTRAVENOUS; SUBCUTANEOUS
Status: DISCONTINUED | OUTPATIENT
Start: 2024-05-22 | End: 2024-05-23 | Stop reason: HOSPADM

## 2024-05-22 RX ORDER — ASPIRIN 325 MG
325 TABLET, DELAYED RELEASE (ENTERIC COATED) ORAL DAILY
Status: DISCONTINUED | OUTPATIENT
Start: 2024-05-22 | End: 2024-05-23 | Stop reason: HOSPADM

## 2024-05-22 RX ORDER — OXYCODONE HYDROCHLORIDE 5 MG/1
5 TABLET ORAL EVERY 4 HOURS PRN
Status: DISCONTINUED | OUTPATIENT
Start: 2024-05-22 | End: 2024-05-23 | Stop reason: HOSPADM

## 2024-05-22 RX ORDER — BUPIVACAINE HYDROCHLORIDE 7.5 MG/ML
INJECTION, SOLUTION INTRASPINAL
Status: DISCONTINUED | OUTPATIENT
Start: 2024-05-22 | End: 2024-05-22

## 2024-05-22 RX ORDER — NALOXONE HYDROCHLORIDE 0.4 MG/ML
0.1 INJECTION, SOLUTION INTRAMUSCULAR; INTRAVENOUS; SUBCUTANEOUS
Status: DISCONTINUED | OUTPATIENT
Start: 2024-05-22 | End: 2024-05-22 | Stop reason: HOSPADM

## 2024-05-22 RX ORDER — GLYCOPYRROLATE 0.2 MG/ML
INJECTION, SOLUTION INTRAMUSCULAR; INTRAVENOUS PRN
Status: DISCONTINUED | OUTPATIENT
Start: 2024-05-22 | End: 2024-05-22

## 2024-05-22 RX ORDER — ACETAMINOPHEN 325 MG/1
650 TABLET ORAL EVERY 4 HOURS PRN
Status: DISCONTINUED | OUTPATIENT
Start: 2024-05-25 | End: 2024-05-23 | Stop reason: HOSPADM

## 2024-05-22 RX ORDER — LIDOCAINE 40 MG/G
CREAM TOPICAL
Status: DISCONTINUED | OUTPATIENT
Start: 2024-05-22 | End: 2024-05-23 | Stop reason: HOSPADM

## 2024-05-22 RX ORDER — CELECOXIB 100 MG/1
100 CAPSULE ORAL 2 TIMES DAILY
Qty: 4 CAPSULE | Refills: 0 | Status: DISCONTINUED | OUTPATIENT
Start: 2024-05-22 | End: 2024-05-23 | Stop reason: HOSPADM

## 2024-05-22 RX ORDER — BUPIVACAINE HYDROCHLORIDE 5 MG/ML
INJECTION, SOLUTION EPIDURAL; INTRACAUDAL
Status: DISCONTINUED | OUTPATIENT
Start: 2024-05-22 | End: 2024-05-22

## 2024-05-22 RX ORDER — PROCHLORPERAZINE MALEATE 10 MG
10 TABLET ORAL EVERY 6 HOURS PRN
Status: DISCONTINUED | OUTPATIENT
Start: 2024-05-22 | End: 2024-05-23 | Stop reason: HOSPADM

## 2024-05-22 RX ORDER — PANTOPRAZOLE SODIUM 40 MG/1
40 TABLET, DELAYED RELEASE ORAL DAILY PRN
Status: DISCONTINUED | OUTPATIENT
Start: 2024-05-22 | End: 2024-05-23 | Stop reason: HOSPADM

## 2024-05-22 RX ORDER — LIDOCAINE HYDROCHLORIDE 10 MG/ML
INJECTION, SOLUTION INFILTRATION; PERINEURAL PRN
Status: DISCONTINUED | OUTPATIENT
Start: 2024-05-22 | End: 2024-05-22

## 2024-05-22 RX ORDER — ONDANSETRON 2 MG/ML
INJECTION INTRAMUSCULAR; INTRAVENOUS PRN
Status: DISCONTINUED | OUTPATIENT
Start: 2024-05-22 | End: 2024-05-22

## 2024-05-22 RX ORDER — CEFAZOLIN SODIUM/WATER 2 G/20 ML
2 SYRINGE (ML) INTRAVENOUS SEE ADMIN INSTRUCTIONS
Status: DISCONTINUED | OUTPATIENT
Start: 2024-05-22 | End: 2024-05-22 | Stop reason: HOSPADM

## 2024-05-22 RX ORDER — ACETAMINOPHEN 325 MG/1
975 TABLET ORAL ONCE
Status: COMPLETED | OUTPATIENT
Start: 2024-05-22 | End: 2024-05-22

## 2024-05-22 RX ORDER — DEXAMETHASONE SODIUM PHOSPHATE 4 MG/ML
4 INJECTION, SOLUTION INTRA-ARTICULAR; INTRALESIONAL; INTRAMUSCULAR; INTRAVENOUS; SOFT TISSUE
Status: DISCONTINUED | OUTPATIENT
Start: 2024-05-22 | End: 2024-05-22 | Stop reason: HOSPADM

## 2024-05-22 RX ORDER — ACETAMINOPHEN 325 MG/1
975 TABLET ORAL EVERY 8 HOURS
Qty: 27 TABLET | Refills: 0 | Status: DISCONTINUED | OUTPATIENT
Start: 2024-05-22 | End: 2024-05-23 | Stop reason: HOSPADM

## 2024-05-22 RX ORDER — CEFAZOLIN SODIUM/WATER 2 G/20 ML
2 SYRINGE (ML) INTRAVENOUS
Status: COMPLETED | OUTPATIENT
Start: 2024-05-22 | End: 2024-05-22

## 2024-05-22 RX ORDER — HYDROMORPHONE HCL IN WATER/PF 6 MG/30 ML
0.4 PATIENT CONTROLLED ANALGESIA SYRINGE INTRAVENOUS EVERY 5 MIN PRN
Status: DISCONTINUED | OUTPATIENT
Start: 2024-05-22 | End: 2024-05-22 | Stop reason: HOSPADM

## 2024-05-22 RX ORDER — ONDANSETRON 4 MG/1
4 TABLET, ORALLY DISINTEGRATING ORAL EVERY 6 HOURS PRN
Status: DISCONTINUED | OUTPATIENT
Start: 2024-05-22 | End: 2024-05-23 | Stop reason: HOSPADM

## 2024-05-22 RX ORDER — HYDROMORPHONE HCL IN WATER/PF 6 MG/30 ML
0.2 PATIENT CONTROLLED ANALGESIA SYRINGE INTRAVENOUS
Status: DISCONTINUED | OUTPATIENT
Start: 2024-05-22 | End: 2024-05-23 | Stop reason: HOSPADM

## 2024-05-22 RX ORDER — PROPOFOL 10 MG/ML
INJECTION, EMULSION INTRAVENOUS CONTINUOUS PRN
Status: DISCONTINUED | OUTPATIENT
Start: 2024-05-22 | End: 2024-05-22

## 2024-05-22 RX ORDER — CELECOXIB 200 MG/1
400 CAPSULE ORAL ONCE
Status: COMPLETED | OUTPATIENT
Start: 2024-05-22 | End: 2024-05-22

## 2024-05-22 RX ORDER — ONDANSETRON 2 MG/ML
4 INJECTION INTRAMUSCULAR; INTRAVENOUS EVERY 30 MIN PRN
Status: DISCONTINUED | OUTPATIENT
Start: 2024-05-22 | End: 2024-05-22 | Stop reason: HOSPADM

## 2024-05-22 RX ORDER — SODIUM CHLORIDE, SODIUM LACTATE, POTASSIUM CHLORIDE, CALCIUM CHLORIDE 600; 310; 30; 20 MG/100ML; MG/100ML; MG/100ML; MG/100ML
INJECTION, SOLUTION INTRAVENOUS CONTINUOUS
Status: DISCONTINUED | OUTPATIENT
Start: 2024-05-22 | End: 2024-05-23 | Stop reason: HOSPADM

## 2024-05-22 RX ORDER — HYDROMORPHONE HCL IN WATER/PF 6 MG/30 ML
0.4 PATIENT CONTROLLED ANALGESIA SYRINGE INTRAVENOUS
Status: DISCONTINUED | OUTPATIENT
Start: 2024-05-22 | End: 2024-05-23 | Stop reason: HOSPADM

## 2024-05-22 RX ORDER — BISACODYL 10 MG
10 SUPPOSITORY, RECTAL RECTAL DAILY PRN
Status: DISCONTINUED | OUTPATIENT
Start: 2024-05-22 | End: 2024-05-23 | Stop reason: HOSPADM

## 2024-05-22 RX ORDER — DEXAMETHASONE SODIUM PHOSPHATE 10 MG/ML
INJECTION, SOLUTION INTRAMUSCULAR; INTRAVENOUS PRN
Status: DISCONTINUED | OUTPATIENT
Start: 2024-05-22 | End: 2024-05-22

## 2024-05-22 RX ORDER — FENTANYL CITRATE 50 UG/ML
25-100 INJECTION, SOLUTION INTRAMUSCULAR; INTRAVENOUS
Status: DISCONTINUED | OUTPATIENT
Start: 2024-05-22 | End: 2024-05-22 | Stop reason: HOSPADM

## 2024-05-22 RX ADMIN — SODIUM CHLORIDE, POTASSIUM CHLORIDE, SODIUM LACTATE AND CALCIUM CHLORIDE: 600; 310; 30; 20 INJECTION, SOLUTION INTRAVENOUS at 16:20

## 2024-05-22 RX ADMIN — PHENYLEPHRINE HYDROCHLORIDE 100 MCG: 10 INJECTION INTRAVENOUS at 12:05

## 2024-05-22 RX ADMIN — ACETAMINOPHEN 975 MG: 325 TABLET ORAL at 09:50

## 2024-05-22 RX ADMIN — PHENYLEPHRINE HYDROCHLORIDE 100 MCG: 10 INJECTION INTRAVENOUS at 12:30

## 2024-05-22 RX ADMIN — PHENYLEPHRINE HYDROCHLORIDE 100 MCG: 10 INJECTION INTRAVENOUS at 13:02

## 2024-05-22 RX ADMIN — SODIUM CHLORIDE, POTASSIUM CHLORIDE, SODIUM LACTATE AND CALCIUM CHLORIDE: 600; 310; 30; 20 INJECTION, SOLUTION INTRAVENOUS at 12:58

## 2024-05-22 RX ADMIN — PHENYLEPHRINE HYDROCHLORIDE 100 MCG: 10 INJECTION INTRAVENOUS at 12:18

## 2024-05-22 RX ADMIN — SENNOSIDES AND DOCUSATE SODIUM 1 TABLET: 8.6; 5 TABLET ORAL at 20:08

## 2024-05-22 RX ADMIN — ACETAMINOPHEN 975 MG: 325 TABLET ORAL at 20:07

## 2024-05-22 RX ADMIN — PROPOFOL 30 MG: 10 INJECTION, EMULSION INTRAVENOUS at 11:54

## 2024-05-22 RX ADMIN — TRANEXAMIC ACID 1950 MG: 650 TABLET ORAL at 09:50

## 2024-05-22 RX ADMIN — DEXAMETHASONE SODIUM PHOSPHATE 4 MG: 10 INJECTION, SOLUTION INTRAMUSCULAR; INTRAVENOUS at 11:40

## 2024-05-22 RX ADMIN — PHENYLEPHRINE HYDROCHLORIDE 100 MCG: 10 INJECTION INTRAVENOUS at 12:34

## 2024-05-22 RX ADMIN — CEFAZOLIN SODIUM 2 G: 2 INJECTION, SOLUTION INTRAVENOUS at 20:09

## 2024-05-22 RX ADMIN — GLYCOPYRROLATE 0.2 MG: 0.2 INJECTION INTRAMUSCULAR; INTRAVENOUS at 11:40

## 2024-05-22 RX ADMIN — BUPIVACAINE HYDROCHLORIDE IN DEXTROSE 2 ML: 7.5 INJECTION, SOLUTION SUBARACHNOID at 11:52

## 2024-05-22 RX ADMIN — ONDANSETRON 4 MG: 2 INJECTION INTRAMUSCULAR; INTRAVENOUS at 11:40

## 2024-05-22 RX ADMIN — LIDOCAINE HYDROCHLORIDE 50 MG: 10 INJECTION, SOLUTION INFILTRATION; PERINEURAL at 11:51

## 2024-05-22 RX ADMIN — PHENYLEPHRINE HYDROCHLORIDE 100 MCG: 10 INJECTION INTRAVENOUS at 12:25

## 2024-05-22 RX ADMIN — FENTANYL CITRATE 50 MCG: 50 INJECTION, SOLUTION INTRAMUSCULAR; INTRAVENOUS at 11:10

## 2024-05-22 RX ADMIN — PHENYLEPHRINE HYDROCHLORIDE 100 MCG: 10 INJECTION INTRAVENOUS at 12:54

## 2024-05-22 RX ADMIN — PROPOFOL 250 MCG/KG/MIN: 10 INJECTION, EMULSION INTRAVENOUS at 11:54

## 2024-05-22 RX ADMIN — PHENYLEPHRINE HYDROCHLORIDE 0.5 MCG/KG/MIN: 10 INJECTION INTRAVENOUS at 11:54

## 2024-05-22 RX ADMIN — CELECOXIB 100 MG: 100 CAPSULE ORAL at 23:03

## 2024-05-22 RX ADMIN — ASPIRIN 325 MG: 325 TABLET ORAL at 16:19

## 2024-05-22 RX ADMIN — CELECOXIB 400 MG: 200 CAPSULE ORAL at 09:50

## 2024-05-22 RX ADMIN — SODIUM CHLORIDE, POTASSIUM CHLORIDE, SODIUM LACTATE AND CALCIUM CHLORIDE: 600; 310; 30; 20 INJECTION, SOLUTION INTRAVENOUS at 11:05

## 2024-05-22 RX ADMIN — MIDAZOLAM HYDROCHLORIDE 1 MG: 1 INJECTION, SOLUTION INTRAMUSCULAR; INTRAVENOUS at 11:09

## 2024-05-22 RX ADMIN — Medication 2 G: at 11:50

## 2024-05-22 RX ADMIN — SODIUM CHLORIDE, POTASSIUM CHLORIDE, SODIUM LACTATE AND CALCIUM CHLORIDE: 600; 310; 30; 20 INJECTION, SOLUTION INTRAVENOUS at 11:35

## 2024-05-22 RX ADMIN — BUPIVACAINE HYDROCHLORIDE 15 ML: 5 INJECTION, SOLUTION EPIDURAL; INTRACAUDAL; PERINEURAL at 11:18

## 2024-05-22 RX ADMIN — PHENYLEPHRINE HYDROCHLORIDE 100 MCG: 10 INJECTION INTRAVENOUS at 12:11

## 2024-05-22 ASSESSMENT — ACTIVITIES OF DAILY LIVING (ADL)
ADLS_ACUITY_SCORE: 26
ADLS_ACUITY_SCORE: 18
ADLS_ACUITY_SCORE: 25
ADLS_ACUITY_SCORE: 26
ADLS_ACUITY_SCORE: 18
ADLS_ACUITY_SCORE: 26
ADLS_ACUITY_SCORE: 18
ADLS_ACUITY_SCORE: 26
ADLS_ACUITY_SCORE: 26
ADLS_ACUITY_SCORE: 18
ADLS_ACUITY_SCORE: 18
ADLS_ACUITY_SCORE: 25
ADLS_ACUITY_SCORE: 18
ADLS_ACUITY_SCORE: 18

## 2024-05-22 NOTE — ANESTHESIA PROCEDURE NOTES
Adductor canal Procedure Note    Pre-Procedure   Staff -        Anesthesiologist:  Paul Lopez MD       Performed By: anesthesiologist       Location: pre-op       Procedure Start/Stop Times: 5/22/2024 11:18 AM and 5/22/2024 11:22 AM       Pre-Anesthestic Checklist: patient identified, IV checked, site marked, risks and benefits discussed, informed consent, monitors and equipment checked, pre-op evaluation, at physician/surgeon's request and post-op pain management  Timeout:       Correct Patient: Yes        Correct Procedure: Yes        Correct Site: Yes        Correct Position: Yes        Correct Laterality: Yes        Site Marked: Yes  Procedure Documentation  Procedure: Adductor canal       Laterality: right       Patient Position: supine       Patient Prep/Sterile Barriers: sterile gloves, mask       Skin prep: Chloraprep       Needle Type: short bevel       Needle Gauge: 20.        Needle Length (Inches): 4        Ultrasound guided       1. Ultrasound was used to identify targeted nerve, plexus, vascular marker, or fascial plane and place a needle adjacent to it in real-time.       2. Ultrasound was used to visualize the spread of anesthetic in close proximity to the above referenced structure.       3. A permanent image is entered into the patient's record.       4. The visualized anatomic structures appeared normal.       5. There were no apparent abnormal pathologic findings.    Assessment/Narrative         The placement was negative for: blood aspirated, painful injection and site bleeding       Paresthesias: No.       Bolus given via needle..        Secured via.        Insertion/Infusion Method: Single Shot       Complications: none    Medication(s) Administered   Bupivacaine 0.5% PF (Infiltration) - Infiltration   15 mL - 5/22/2024 11:18:00 AM  Medication Administration Time: 5/22/2024 11:18 AM      FOR [facility] ([facility]) ONLY:   Pain Team Contact information: please page the Pain Team Via  "Amcom. Search \"Pain\". During daytime hours, please page the attending first. At night please page the resident first.      "

## 2024-05-22 NOTE — PHARMACY-ADMISSION MEDICATION HISTORY
Pharmacist Admission Medication History    Admission medication history is complete. The information provided in this note is only as accurate as the sources available at the time of the update.    Information Source(s): Patient and CareEverywhere/SureScripts via in-person    Pertinent Information:     Allergies reviewed with patient and updates made in EHR: no    Medication History Completed By: Maranda Figueroa RPH 5/22/2024 11:19 AM    PTA Med List   Medication Sig Last Dose    acetaminophen (TYLENOL) 500 MG tablet Take 500-1,000 mg by mouth every 6 hours as needed for mild pain 5/21/2024    omeprazole (PRILOSEC) 20 MG DR capsule Take 20 mg by mouth daily as needed 5/21/2024 at PM    valACYclovir (VALTREX) 1000 mg tablet Take 1 tablet by mouth daily as needed Prn cold sores not recent

## 2024-05-22 NOTE — BRIEF OP NOTE
[facility]    Brief Operative Note    Pre-operative diagnosis: Osteoarthritis of right knee [M17.11]  Post-operative diagnosis Same as pre-operative diagnosis    Procedure: RIGHT TOTAL KNEE ARTHROPLASTY, Right - Knee    Surgeon: Surgeons and Role:     * Arron Guzman MD - Primary     * Lachelle Erickson PA-LENKA - Assisting  Anesthesia: General   Estimated Blood Loss: Less than 50 ml    Drains: None  Specimens: * No specimens in log *  Findings:   Grade 4 tricompartmental osteoarthritis .  Complications: None.  Implants:   Implant Name Type Inv. Item Serial No.  Lot No. LRB No. Used Action   LINSEY MIS QUAD-SPARING TOTAL KNEE PROCEDURE HEADED SCREW 48MM LENGTH Total Joint Component/Insert   LINSEY [id number] Right 1 Implanted   LINSEY MIS QUAD-SPARING TOTAL KNEE PROCEDURE HEADED SCREW Total Joint Component/Insert   LINSEY [id number] Right 1 Implanted   LINSEY PERSONA REVISION HEX HEADED SCREW 3.5MM HEX 48MM LENGTH Total Joint Component/Insert   LINSEY [id number] Right 1 Implanted   BONE CEMENT RADIOPAQUE SIMPLEX HV FULL DOSE 6194-1-001 - [id number] Cement, Bone BONE CEMENT RADIOPAQUE SIMPLEX HV FULL DOSE 6194-1-001  [employer] [id number] Right 1 Implanted   PATELLA ALL POLY 29MM - [id number] Total Joint Component/Insert PATELLA ALL POLY 29MM  [employer] [id number] Right 1 Implanted   KNEE FEMUR CR CEMENT CCR STD SZ 5 R - [id number] Total Joint Component/Insert KNEE FEMUR CR CEMENT CCR STD SZ 5 R  [employer] [id number] Right 1 Implanted   IMP TIBIAL ZIM PSN NP STM 5DEG SKYE AMADO -153-02 - [id number] Total Joint Component/Insert IMP TIBIAL ZIM PSN NP STM 5DEG SKYE AMADO -201-02  [employer] [id number] Right 1 Implanted   SURFACE ARTC 12MM PERSONA [place] CNGR 4-5 C-D KN RT TIB - [id number] Total Joint Component/Insert SURFACE ARTC 12MM PERSONJOSEPH DELAROSA CNGR 4-5 C-D KN RT TIB  [employer] [id number] Right 1 Implanted

## 2024-05-22 NOTE — PROGRESS NOTES
Patient vital signs are at baseline: Yes  Patient able to ambulate as they were prior to admission or with assist devices provided by therapies during their stay:  Yes. 1 assist, walker and gait belt in use for safety.   Patient MUST void prior to discharge:  Yes  Patient able to tolerate oral intake:  Yes  Pain has adequate pain control using Oral analgesics:  Yes  Does patient have an identified :  Yes  Has goal D/C date and time been discussed with patient:  Yes

## 2024-05-22 NOTE — ANESTHESIA CARE TRANSFER NOTE
Patient: Mariela Doyle    Procedure: Procedure(s):  RIGHT TOTAL KNEE ARTHROPLASTY       Diagnosis: Osteoarthritis of right knee [M17.11]  Diagnosis Additional Information: No value filed.    Anesthesia Type:   No value filed.     Note:    Oropharynx: oropharynx clear of all foreign objects  Level of Consciousness: awake  Oxygen Supplementation: face mask  Level of Supplemental Oxygen (L/min / FiO2): 8  Independent Airway: airway patency satisfactory and stable  Dentition: dentition unchanged  Vital Signs Stable: post-procedure vital signs reviewed and stable  Report to RN Given: handoff report given  Patient transferred to: PACU    Handoff Report: Identifed the Patient, Identified the Reponsible Provider, Reviewed the pertinent medical history, Discussed the surgical course, Reviewed Intra-OP anesthesia mangement and issues during anesthesia, Set expectations for post-procedure period and Allowed opportunity for questions and acknowledgement of understanding      Vitals:  Vitals Value Taken Time   /59 05/22/24 1310   Temp 36.4  C (97.6  F) 05/22/24 1308   Pulse 91 05/22/24 1310   Resp 28 05/22/24 1310   SpO2 99 % 05/22/24 1310       Electronically Signed By: BRITTANI Steinberg CRNA  May 22, 2024  1:11 PM

## 2024-05-22 NOTE — INTERVAL H&P NOTE
"I have reviewed the surgical (or preoperative) H&P that is linked to this encounter, and examined the patient. There are no significant changes    Clinical Conditions Present on Arrival:  Clinically Significant Risk Factors Present on Admission                  # Severe Obesity: Estimated body mass index is 43.9 kg/m  as calculated from the following:    Height as of this encounter: 1.575 m (5' 2\").    Weight as of this encounter: 108.9 kg (240 lb).       "

## 2024-05-22 NOTE — ANESTHESIA PREPROCEDURE EVALUATION
Anesthesia Pre-Procedure Evaluation    Patient: Mariela Doyle   MRN: [id number] : 1970        Procedure : Procedure(s):  RIGHT TOTAL KNEE ARTHROPLASTY          Past Medical History:   Diagnosis Date    Arthritis     Elevated blood pressure reading in office without diagnosis of hypertension 2019    Gastroesophageal reflux disease     Obese       Past Surgical History:   Procedure Laterality Date    ABDOMEN SURGERY  1971    Pyloric stenosis    COLONOSCOPY      EYE SURGERY Bilateral     Lasik      No Known Allergies   Social History     Tobacco Use    Smoking status: Never    Smokeless tobacco: Never   Substance Use Topics    Alcohol use: Not Currently     Comment: 4-10 drinks per week      Wt Readings from Last 1 Encounters:   24 108.9 kg (240 lb)        Anesthesia Evaluation   Pt has not had prior anesthetic         ROS/MED HX  ENT/Pulmonary:  - neg pulmonary ROS     Neurologic:  - neg neurologic ROS     Cardiovascular:       METS/Exercise Tolerance:     Hematologic:  - neg hematologic  ROS     Musculoskeletal:  - neg musculoskeletal ROS     GI/Hepatic:     (+) GERD,                   Renal/Genitourinary:  - neg Renal ROS     Endo:     (+)               Obesity,       Psychiatric/Substance Use:  - neg psychiatric ROS     Infectious Disease:  - neg infectious disease ROS     Malignancy:       Other:            Physical Exam    Airway        Mallampati: II   TM distance: > 3 FB   Neck ROM: full   Mouth opening: > 3 cm    Respiratory Devices and Support         Dental           Cardiovascular   cardiovascular exam normal          Pulmonary   pulmonary exam normal                OUTSIDE LABS:  CBC:   Lab Results   Component Value Date    WBC 9.7 2024    WBC 9.6 2024    HGB 12.5 2024    HGB 13.4 2024    HCT 38.5 2024    HCT 40.6 2024     2024     2024     BMP:   Lab Results   Component Value Date     2024    POTASSIUM 4.6  "03/05/2024    CHLORIDE 99 03/05/2024    CO2 26 03/05/2024    BUN 14.4 03/05/2024    CR 0.93 03/05/2024    GLC 97 03/05/2024     COAGS: No results found for: \"PTT\", \"INR\", \"FIBR\"  POC: No results found for: \"BGM\", \"HCG\", \"HCGS\"  HEPATIC:   Lab Results   Component Value Date    ALBUMIN 4.5 03/05/2024    PROTTOTAL 8.7 (H) 03/05/2024    ALT 36 03/05/2024    AST 22 03/05/2024    ALKPHOS 91 03/05/2024    BILITOTAL 0.3 03/05/2024     OTHER:   Lab Results   Component Value Date    LACT 0.8 05/22/2024    A1C 5.7 (H) 03/28/2024    ASHIA 9.8 03/05/2024    LIPASE 42 03/05/2024    TSH 2.97 03/28/2024       Anesthesia Plan    ASA Status:  3       Anesthesia Type: Spinal.              Consents            Postoperative Care    Pain management: IV analgesics, Oral pain medications, Peripheral nerve block (Single Shot).   PONV prophylaxis: Ondansetron (or other 5HT-3), Dexamethasone or Solumedrol     Comments:               Paul Lopez MD    I have reviewed the pertinent notes and labs in the chart from the past 30 days and (re)examined the patient.  Any updates or changes from those notes are reflected in this note.              # Severe Obesity: Estimated body mass index is 43.9 kg/m  as calculated from the following:    Height as of this encounter: 1.575 m (5' 2\").    Weight as of this encounter: 108.9 kg (240 lb).      "

## 2024-05-22 NOTE — OP NOTE
Preoperative diagnosis: Right knee osteoarthrosis.     Postoperative diagnosis: Right knee osteoarthrosis end-stage    Procedure performed: Right total knee arthroplasty    Surgeon: Arron Guzman M.D.    First assistant: Lachelle Erickson was used in this case to assist in retraction, bony cuts, trial implantation, and permanent implantation of total knee arthroplasty as well as wound closure and dressing placement.    Anesthesia: Spinal    Complications: None    Estimated blood loss: 50 mL    Findings: Grade 4 tricompartmental osteoarthritis     Implants Used:     Carolee Persona size 5 right CR femur, size D right the stemmed tibia, 12mm CR polyethylene, 29mm patella.    Indications:    This patient has severe pain secondary to severe right knee osteoarthrosis.  This patient has failed or elected to forego nonoperative care including but not limited to physical therapy, injections, and pain control with medication.  This patient has significant pain affecting quality of life.  After understanding risks, benefits, alternatives, potential outcomes, the nature of the procedure, and rehabilitation the patient undergo right total knee arthroplasty.    Procedure:    The patient was identified in the preoperative holding area and the right knee was marked as the operative site.  The patient was brought to the operating suite where spinal anesthesia was provided by the anesthesia staff.  The right lower extremity was prepped and draped in the standard sterile fashion.  A timeout was performed.  The patient received antibiotics prior to incision.  We exsanguinated and elevated the thigh tourniquet to 300 mmHg.    A midline incision was made for a medial parapatellar approach.  The fat pad and ACL were excised.  An intramedullary guide was used to align the distal femoral cut at 5  valgus.  The 4-in-1 cutting block was sized and stabilized for the remainder of the distal femoral resections.  Next an  extra medullary tibial cutting guide was used to resect the proximal tibia in neutral alignment.  Posterior osteophytes and the menisci were excised.  We undercut the patella and reamed for the patella.  The above-noted implants were trialed.  The knee had excellent stability from 0-90  of knee flexion with range of motion 0 to 130.  The knee was stable varus valgus stress at 0 and 90 .  Equal flexion extension gaps.  Excellent tracking of the patella with the no hands test.    The trial implants were removed.  We reamed and broached for the femur and the tibia.  The bone was copiously irrigated and dried.  We cemented our implants and the place.  Biomet cement was used.  We allowed the cement to dry and removed excess cement.  The tourniquet was deflated.  We copiously irrigated.  Hemostasis was achieved.  A layered closure was performed. Sterile dressings were placed.    The patient will be admitted to the hospital status post right total knee arthroplasty for postoperative pain control, DVT prophylaxis, physical therapy, and medical management.  The patient is weightbearing as tolerated.  The patient will be on ASA 81 mg p.o. twice daily x 6 weeks.  Estimated length of stay is 1 midnights.      Return to clinic in 2 weeks for reevaluation and x-rays.        Arron Guzman MD

## 2024-05-22 NOTE — ANESTHESIA POSTPROCEDURE EVALUATION
Patient: Mariela Doyle    Procedure: Procedure(s):  RIGHT TOTAL KNEE ARTHROPLASTY       Anesthesia Type:  No value filed.    Note:  Disposition: Inpatient   Postop Pain Control: Uneventful            Sign Out: Well controlled pain   PONV: No   Neuro/Psych: Uneventful            Sign Out: Acceptable/Baseline neuro status   Airway/Respiratory: Uneventful            Sign Out: Acceptable/Baseline resp. status   CV/Hemodynamics: Uneventful            Sign Out: Acceptable CV status; No obvious hypovolemia; No obvious fluid overload   Other NRE: NONE   DID A NON-ROUTINE EVENT OCCUR? No           Last vitals:  Vitals Value Taken Time   /75 05/22/24 1400   Temp 35.8  C (96.44  F) 05/22/24 1337   Pulse 87 05/22/24 1400   Resp 16 05/22/24 1400   SpO2 95 % 05/22/24 1400   Vitals shown include unfiled device data.    Electronically Signed By: Paul Lopez MD  May 22, 2024  2:42 PM

## 2024-05-22 NOTE — ANESTHESIA PROCEDURE NOTES
"Intrathecal injection Procedure Note    Pre-Procedure   Staff -        Anesthesiologist:  Paul Lopez MD       Performed By: anesthesiologist       Location: OR       Procedure Start/Stop Times: 5/22/2024 11:48 AM and 5/22/2024 11:53 AM       Pre-Anesthestic Checklist: patient identified, IV checked, risks and benefits discussed, informed consent, monitors and equipment checked, pre-op evaluation, at physician/surgeon's request and post-op pain management  Timeout:       Correct Patient: Yes        Correct Procedure: Yes        Correct Site: Yes        Correct Position: Yes   Procedure Documentation  Procedure: intrathecal injection       Patient Position: sitting       Patient Prep/Sterile Barriers: sterile gloves, mask, patient draped       Skin prep: Chloraprep       Insertion Site: L2-3. (midline approach).       Needle Gauge: 25.        Needle Length (Inches): 3.5        Spinal Needle Type: Pencan       Introducer used       # of attempts: 1 and  # of redirects:     Assessment/Narrative         Paresthesias: No.       CSF fluid: clear.       Opening pressure was cmH2O while  Sitting.      Medication(s) Administered   0.75% Hyperbaric Bupivacaine (Intrathecal) - Intrathecal   2 mL - 5/22/2024 11:52:00 AM  Medication Administration Time: 5/22/2024 11:48 AM      FOR [facility] ([facility] ONLY:   Pain Team Contact information: please page the Pain Team Via Flow[employer]. Search \"Pain\". During daytime hours, please page the attending first. At night please page the resident first.      "

## 2024-05-22 NOTE — ANESTHESIA POSTPROCEDURE EVALUATION
Patient: Mariela Doyle    Procedure: Procedure(s):  RIGHT TOTAL KNEE ARTHROPLASTY       Anesthesia Type:  No value filed.    Note:  Disposition: Inpatient   Postop Pain Control: Uneventful            Sign Out: Well controlled pain   PONV: No   Neuro/Psych: Uneventful            Sign Out: Acceptable/Baseline neuro status   Airway/Respiratory: Uneventful            Sign Out: Acceptable/Baseline resp. status   CV/Hemodynamics: Uneventful            Sign Out: Acceptable CV status; No obvious hypovolemia; No obvious fluid overload   Other NRE: NONE   DID A NON-ROUTINE EVENT OCCUR? No           Last vitals:  Vitals Value Taken Time   /75 05/22/24 1400   Temp 35.8  C (96.44  F) 05/22/24 1337   Pulse 87 05/22/24 1400   Resp 16 05/22/24 1400   SpO2 95 % 05/22/24 1400   Vitals shown include unfiled device data.    Electronically Signed By: Paul Lopez MD  May 22, 2024  2:41 PM

## 2024-05-22 NOTE — PLAN OF CARE
Goal Outcome Evaluation:  Patient alert and orientated to room, phone, call light, rounding.  Right knee dressing remains clean, dry, intact.   Ice pack to right knee.  Patient reports numbness in bilateral legs, block starting to wear off now able to wiggle feet.  Mom at bedside.        Patient vital signs are at baseline: Yes, IV fluids infusing.   Patient able to ambulate as they were prior to admission or with assist devices provided by therapies during their stay:  No,  Reason:  patient had block and bilateral lower legs numb, sensation starting to come back per patient.   Patient MUST void prior to discharge:  No,  Reason:  no urge to void, bladder scanned for 457cc, nursing will continue to monitor.   Patient able to tolerate oral intake:  Yes  Pain has adequate pain control using Oral analgesics:  No,  Reason:  denies pain, has not had to take po medications yet.  Does patient have an identified :  Yes  Has goal D/C date and time been discussed with patient:  Yes, plan home when able with help from mom.

## 2024-05-23 ENCOUNTER — APPOINTMENT (OUTPATIENT)
Dept: PHYSICAL THERAPY | Facility: CLINIC | Age: 54
End: 2024-05-23
Attending: ORTHOPAEDIC SURGERY
Payer: COMMERCIAL

## 2024-05-23 ENCOUNTER — APPOINTMENT (OUTPATIENT)
Dept: OCCUPATIONAL THERAPY | Facility: CLINIC | Age: 54
End: 2024-05-23
Attending: ORTHOPAEDIC SURGERY
Payer: COMMERCIAL

## 2024-05-23 VITALS
WEIGHT: 240 LBS | OXYGEN SATURATION: 99 % | BODY MASS INDEX: 44.16 KG/M2 | DIASTOLIC BLOOD PRESSURE: 61 MMHG | HEIGHT: 62 IN | HEART RATE: 71 BPM | TEMPERATURE: 97.8 F | SYSTOLIC BLOOD PRESSURE: 128 MMHG | RESPIRATION RATE: 17 BRPM

## 2024-05-23 LAB
FASTING STATUS PATIENT QL REPORTED: YES
GLUCOSE SERPL-MCNC: 113 MG/DL (ref 70–99)
HGB BLD-MCNC: 10.1 G/DL (ref 11.7–15.7)

## 2024-05-23 PROCEDURE — 250N000011 HC RX IP 250 OP 636: Performed by: ORTHOPAEDIC SURGERY

## 2024-05-23 PROCEDURE — 97161 PT EVAL LOW COMPLEX 20 MIN: CPT | Mod: GP

## 2024-05-23 PROCEDURE — 250N000013 HC RX MED GY IP 250 OP 250 PS 637: Performed by: ORTHOPAEDIC SURGERY

## 2024-05-23 PROCEDURE — 97165 OT EVAL LOW COMPLEX 30 MIN: CPT | Mod: GO

## 2024-05-23 PROCEDURE — 97535 SELF CARE MNGMENT TRAINING: CPT | Mod: GO

## 2024-05-23 PROCEDURE — 82947 ASSAY GLUCOSE BLOOD QUANT: CPT | Performed by: ORTHOPAEDIC SURGERY

## 2024-05-23 PROCEDURE — 36415 COLL VENOUS BLD VENIPUNCTURE: CPT | Performed by: ORTHOPAEDIC SURGERY

## 2024-05-23 PROCEDURE — 97110 THERAPEUTIC EXERCISES: CPT | Mod: GP

## 2024-05-23 PROCEDURE — 85018 HEMOGLOBIN: CPT | Performed by: ORTHOPAEDIC SURGERY

## 2024-05-23 RX ORDER — AMOXICILLIN 250 MG
1 CAPSULE ORAL 2 TIMES DAILY
Qty: 30 TABLET | Refills: 0 | Status: SHIPPED | OUTPATIENT
Start: 2024-05-23 | End: 2024-07-05

## 2024-05-23 RX ORDER — ASPIRIN 81 MG/1
81 TABLET ORAL 2 TIMES DAILY
Qty: 60 TABLET | Refills: 0 | Status: SHIPPED | OUTPATIENT
Start: 2024-05-24 | End: 2024-06-23

## 2024-05-23 RX ORDER — CELECOXIB 100 MG/1
100 CAPSULE ORAL 2 TIMES DAILY
Qty: 28 CAPSULE | Refills: 0 | Status: SHIPPED | OUTPATIENT
Start: 2024-05-23 | End: 2024-07-05

## 2024-05-23 RX ORDER — OXYCODONE HYDROCHLORIDE 5 MG/1
5 TABLET ORAL EVERY 4 HOURS PRN
Qty: 25 TABLET | Refills: 0 | Status: SHIPPED | OUTPATIENT
Start: 2024-05-23 | End: 2024-07-05

## 2024-05-23 RX ADMIN — SENNOSIDES AND DOCUSATE SODIUM 1 TABLET: 8.6; 5 TABLET ORAL at 08:44

## 2024-05-23 RX ADMIN — CEFAZOLIN SODIUM 2 G: 2 INJECTION, SOLUTION INTRAVENOUS at 04:27

## 2024-05-23 RX ADMIN — ASPIRIN 325 MG: 325 TABLET ORAL at 08:43

## 2024-05-23 RX ADMIN — POLYETHYLENE GLYCOL 3350 17 G: 17 POWDER, FOR SOLUTION ORAL at 08:44

## 2024-05-23 RX ADMIN — CELECOXIB 100 MG: 100 CAPSULE ORAL at 08:43

## 2024-05-23 RX ADMIN — ACETAMINOPHEN 975 MG: 325 TABLET ORAL at 04:26

## 2024-05-23 ASSESSMENT — ACTIVITIES OF DAILY LIVING (ADL)
ADLS_ACUITY_SCORE: 26

## 2024-05-23 NOTE — DISCHARGE SUMMARY
[facility] Discharge Summary                                  [facility]     OTONIEL PELAEZ [id number]   Age: 53 year old  PCP: Mara Arevalo, [phone] 1970     Date of Admission:  5/22/2024  Date of Discharge::  5/23/2024  Discharge Provider:  BRITTANI Recinos CNP    Code status:  Full Code    Admission Information:  Admission Diagnosis:  Osteoarthritis of right knee [M17.11]    Post-Operative Day: 1 Day Post-Op     Reason for admission:  The patient was admitted for the following:Procedure(s) (LRB):  RIGHT TOTAL KNEE ARTHROPLASTY (Right)    Active Problems:    History of total knee arthroplasty, left      Allergies:  Patient has no known allergies.    Following the procedure noted above the patient was transferred to the post-op floor and started on:    Therapy:  physical therapy and occupational therapy  Anticoagulation Plan: Aspirin 81 mg BID  for 42 days  Pain Management: scopainmedication: oxycodone and tylenol  Weight bearing status: Weight bearing as tolerated     The patient was followed by Orthopedics during the inpatient treatment course:  Complications:  None  Additional consultations:  None     Pertinent Labs   Lab Results: personally reviewed.     Recent Labs   Lab Test 05/23/24  0538 05/02/24  1623 03/05/24  1806   WBC  --  9.7 9.6   HGB 10.1* 12.5 13.4   HCT  --  38.5 40.6   MCV  --  93 92   PLT  --  296 321   NA  --   --  138          Discharge Information:  Condition at discharge: Stable  Discharge destination:  Discharged to home     Medications at discharge:  Current Discharge Medication List        START taking these medications    Details   aspirin 81 MG EC tablet Take 1 tablet (81 mg) by mouth 2 times daily for 30 days  Qty: 60 tablet, Refills: 0    Associated Diagnoses: Hx of total knee replacement, right      celecoxib (CELEBREX) 100 MG capsule Take 1 capsule (100 mg) by mouth 2 times daily for 14 days  Qty: 28 capsule, Refills: 0    Associated  Diagnoses: Hx of total knee replacement, right      oxyCODONE (ROXICODONE) 5 MG tablet Take 1 tablet (5 mg) by mouth every 4 hours as needed for moderate pain  Qty: 25 tablet, Refills: 0    Associated Diagnoses: Hx of total knee replacement, right      senna-docusate (SENOKOT-S/PERICOLACE) 8.6-50 MG tablet Take 1 tablet by mouth 2 times daily  Qty: 30 tablet, Refills: 0    Associated Diagnoses: Hx of total knee replacement, right           CONTINUE these medications which have NOT CHANGED    Details   acetaminophen (TYLENOL) 500 MG tablet Take 500-1,000 mg by mouth every 6 hours as needed for mild pain      omeprazole (PRILOSEC) 20 MG DR capsule Take 20 mg by mouth daily as needed      valACYclovir (VALTREX) 1000 mg tablet Take 1 tablet by mouth daily as needed Prn cold sores                        Follow-Up Care:  Patient should be seen in the office in 14 days by the Orthopedic Surgeon/Physician Assistant.  Call [phone] for appointment or questions.    It was my pleasure to take care of the above patient.  BRITTANI Recinos CNP

## 2024-05-23 NOTE — PROGRESS NOTES
05/23/24 0829   Appointment Info   Signing Clinician's Name / Credentials (OT) Joyce Franklin OTD OTR/L   Quick Adds   Quick Adds Certification   Living Environment   People in Home alone   Current Living Arrangements apartment   Home Accessibility no concerns   Living Environment Comments Tub/shower, raised toilet seat with arm rest   Self-Care   Equipment Currently Used at Home raised toilet seat   Activity/Exercise/Self-Care Comment Typically ind with all ADLs and IADLs   General Information   Onset of Illness/Injury or Date of Surgery 05/22/24   Referring Physician Arron Guzman MD   Patient/Family Therapy Goal Statement (OT) To heal safely   Additional Occupational Profile Info/Pertinent History of Current Problem PMH of GERD, OA, obesity   Existing Precautions/Restrictions weight bearing   Right Lower Extremity (Weight-bearing Status) weight-bearing as tolerated (WBAT)   Cognitive Status Examination   Orientation Status orientation to person, place and time   Affect/Mental Status (Cognitive) WNL   Follows Commands WNL   Visual Perception   Visual Impairment/Limitations WNL   Posture   Posture not impaired   Range of Motion Comprehensive   General Range of Motion no range of motion deficits identified   Strength Comprehensive (MMT)   General Manual Muscle Testing (MMT) Assessment no strength deficits identified   Bed Mobility   Bed Mobility supine-sit   Supine-Sit [place] (Bed Mobility) contact guard   Assistive Device (Bed Mobility) bed rails   Transfers   Transfers sit-stand transfer;toilet transfer   Sit-Stand Transfer   Sit-Stand [place] (Transfers) contact guard   Toilet Transfer   [place] Level (Toilet Transfer) contact guard   Balance   Balance Assessment standing dynamic balance   Standing Balance: Dynamic contact guard   Activities of Daily Living   BADL Assessment/Intervention lower body dressing;toileting;grooming   Lower Body Dressing Assessment/Training   [place] Level  (Lower Body Dressing) minimum assist (75% patient effort)   Grooming Assessment/Training   [place] Level (Grooming) contact guard assist   Toileting   [place] Level (Toileting) contact guard assist   Clinical Impression   Criteria for Skilled Therapeutic Interventions Met (OT) Yes, treatment indicated   OT Diagnosis Decreased ind with ADLs and safety   Influenced by the following impairments S/p TKA   OT Problem List-Impairments impacting ADL post-surgical precautions;pain   Assessment of Occupational Performance 3-5 Performance Deficits   Identified Performance Deficits dressing, toileting, bathing, fxl transfers/bed mob   Planned Therapy Interventions (OT) ADL retraining;bed mobility training;transfer training   Clinical Decision Making Complexity (OT) problem focused assessment/low complexity   Risk & Benefits of therapy have been explained evaluation/treatment results reviewed;care plan/treatment goals reviewed;risks/benefits reviewed;current/potential barriers reviewed;patient   OT Total Evaluation Time   OT Eval, Low Complexity Minutes (37962) 9   Therapy Certification   Medical Diagnosis S/p TKA   Start of Care Date 05/22/24   Certification date from 05/23/24   Certification date to 06/23/24   OT Goals   Therapy Frequency (OT) One time eval and treatment   OT Predicted Duration/Target Date for Goal Attainment 05/23/24   OT Goals Lower Body Dressing;Hygiene/Grooming;Toilet Transfer/Toileting;Bed Mobility   OT: Hygiene/Grooming modified independent;while standing;Goal Met;Completed   OT: Lower Body Dressing Supervision/stand-by assist;Goal Met;Completed   OT: Bed Mobility Supervision/stand-by assist;supine to/from sitting;within precautions;Goal Met;Completed   OT: Toilet Transfer/Toileting Modified independent;toilet transfer;cleaning and garment management;Goal Met;Completed   Self-Care/Home Management   Self-Care/Home Mgmt/ADL, Compensatory, Meal Prep Minutes (82424) 23   Symptoms Noted  During/After Treatment (Meal Preparation/Planning Training) none   Treatment Detail/Skilled Intervention Eval completed, treatment initiated. Fxl mob to/from bathroom SBA, min cueing for walker safety. Cueing for hand placement with toilet transfer to simulate home set up, cueing for LE placement to increase comfort/ease with transfers. Completed toileting mod (I) following cueing. Stand g/h mod (I) following cueing for safe walker placement with standing tasks. Visual demo of shower/tub transfer and hand placement, reviewed recommendations for shower safety. Completed bed mobility with sim home set up, cueing for sequencing, completed SBA following cueing. Instructed on proper positioning for rest/sleep, demos understanding. Returned to bedside chair, instructed on LE threading technique, donned underwear, shorts, and socks SBA following cueing. Min A for R shoe ties. Reviewed car transfer with visual demo. Handout provided - all questions answered.   OT Discharge Planning   OT Plan D/c OT   OT Discharge Recommendation (DC Rec) other (see comments)  (Defer to ortho)   OT Rationale for DC Rec Pt mobilizing well and met all OT goals, has good assist from mother at home   OT Brief overview of current status SBA-mod (I) fxl mob and ADLs   Total Session Time   Timed Code Treatment Minutes 23   Total Session Time (sum of timed and untimed services) 32    [facility]  OUTPATIENT OCCUPATIONAL THERAPY  EVALUATION  PLAN OF TREATMENT FOR OUTPATIENT REHABILITATION  (COMPLETE FOR INITIAL CLAIMS ONLY)  Patient's Last Name, First Name, M.I.  YOB: 1970  Mariela Doyle                          Provider's Name  [facility] Medical Record No.  [id number]                             Onset Date:  05/22/24   Start of Care Date:  05/22/24   Type:     ___PT   _X_OT   ___SLP Medical Diagnosis:  S/p TKA                    OT Diagnosis:  Decreased ind with ADLs  and safety Visits from SOC:  1     See note for plan of treatment, functional goals and certification details    I CERTIFY THE NEED FOR THESE SERVICES FURNISHED UNDER        THIS PLAN OF TREATMENT AND WHILE UNDER MY CARE     (Physician co-signature of this document indicates review and certification of the therapy plan).

## 2024-05-23 NOTE — PLAN OF CARE
Problem: Adult Inpatient Plan of Care  Goal: Optimal Comfort and Wellbeing  Outcome: Progressing  Intervention: Monitor Pain and Promote Comfort     Problem: Knee Arthroplasty  Goal: Optimal Pain Control and Function  Outcome: Progressing  Intervention: Prevent or Manage Pain   Goal Outcome Evaluation:       Patient vital signs are at baseline: Yes  Patient able to ambulate as they were prior to admission or with assist devices provided by therapies during their stay:  Yes  Patient MUST void prior to discharge:  Yes  Patient able to tolerate oral intake:  Yes  Pain has adequate pain control using Oral analgesics:  Yes  Does patient have an identified :  Yes  Has goal D/C date and time been discussed with patient:  Yes    Pt is alert and oriented x 4. VSS. CMS intact. Pt reported headache once, managed with scheduled Tylenol and ice packs. Denies incisional pain. Incision dressing is CDI. Up with SBA, GB, and walker.

## 2024-05-23 NOTE — PROGRESS NOTES
"[facility] Progress Note      Post-operative Day: 1 Day Post-Op    Procedure(s):  RIGHT TOTAL KNEE ARTHROPLASTY      Subjective: Patient seen resting in bed, no acute events overnight. Pain has been tolerable  on PO analgesics. Tolerating a regular diet with no nausea or vomiting. Voiding without difficulty and passing gas.     Pain: mild/moderate  Chest pain, SOB:  No      Objective:  Blood pressure 128/61, pulse 71, temperature 97.8  F (36.6  C), temperature source Oral, resp. rate 17, height 1.575 m (5' 2\"), weight 108.9 kg (240 lb), SpO2 99%.    Patient Vitals for the past 24 hrs:   BP Temp Temp src Pulse Resp SpO2   05/23/24 0803 128/61 97.8  F (36.6  C) Oral 71 17 99 %   05/23/24 0419 109/64 97.5  F (36.4  C) Oral 61 16 96 %   05/23/24 0018 116/66 97.4  F (36.3  C) Oral 73 16 96 %   05/22/24 2120 128/61 98  F (36.7  C) Oral 59 16 96 %   05/22/24 1720 136/73 97.2  F (36.2  C) Oral 76 16 96 %   05/22/24 1620 134/75 97.4  F (36.3  C) Axillary 88 16 95 %   05/22/24 1520 (!) 146/77 97.8  F (36.6  C) Oral 88 16 98 %   05/22/24 1450 127/79 98  F (36.7  C) Axillary 80 16 97 %   05/22/24 1420 (!) 140/71 98  F (36.7  C) Oral 75 16 96 %   05/22/24 1400 (!) 144/75 -- -- 87 16 95 %   05/22/24 1350 137/72 -- -- 83 16 96 %   05/22/24 1340 125/66 -- -- 81 23 98 %   05/22/24 1330 113/62 97.7  F (36.5  C) Temporal 81 23 97 %   05/22/24 1320 108/60 (!) 96.4  F (35.8  C) Core 85 18 100 %   05/22/24 1310 102/59 -- -- 91 28 99 %   05/22/24 1308 100/56 97.6  F (36.4  C) Temporal -- 16 99 %   05/22/24 1135 (!) 166/90 -- -- 70 26 100 %   05/22/24 1130 (!) 155/87 -- -- 70 28 99 %   05/22/24 1125 (!) 153/83 -- -- 67 29 100 %   05/22/24 1120 (!) 150/78 -- -- 65 23 100 %   05/22/24 1115 (!) 145/84 -- -- 65 19 100 %   05/22/24 1110 (!) 144/82 -- -- 67 22 100 %   05/22/24 1105 134/76 -- -- 68 26 99 %       Wt Readings from Last 4 Encounters:   05/22/24 108.9 kg (240 lb)   05/02/24 108.9 kg (240 lb 3 oz)   03/28/24 111.6 kg " (246 lb)   03/05/24 114.8 kg (253 lb)       General: Alert and orientated, NAD  Respiratory: Non-labored breathing on RA  RLE motor function, sensation, and circulation intact   Yes, Dorsiflexion/plantarflexion intact and equal bilaterally. Moves all other extremities with ease and purpose   Wound status: incisions are clean dry and intact. Yes Surrounding ecchymosis   Calf tenderness: Bilateral  No    Pertinent Labs   Lab Results: personally reviewed.     Recent Labs   Lab Test 05/23/24  0538 05/02/24  1623 03/05/24  1806   WBC  --  9.7 9.6   HGB 10.1* 12.5 13.4   HCT  --  38.5 40.6   MCV  --  93 92   PLT  --  296 321   NA  --   --  138       Plan:   Anticoagulation protocol: Aspirin 81 mg BID  x 42  days  Pain medications:  scopainmedication: oxycodone, Celebrex and Tylenol  Weight bearing status:  WBAT  Disposition:  Home today pending therapies    Continue cares and rehabilitation     Report completed by:  BRITTANI Recinos CNP  Date: 5/23/2024  Time: 11:02 AM

## 2024-05-23 NOTE — PROGRESS NOTES
Discharge paperwork addressed thoroughly with pt. Questions answered and addressed at length by writer. AVS sent with pt.    Taylor R Schoenecker, RN

## 2024-05-23 NOTE — PROGRESS NOTES
05/23/24 0730   Appointment Info   Signing Clinician's Name / Credentials (PT) Nico Brown, PT, DPT   Quick Adds   Quick Adds Certification   Living Environment   People in Home alone   Current Living Arrangements apartment   Home Accessibility no concerns   Living Environment Comments Mom will be staying with pt for a few days   Self-Care   Usual Activity Tolerance good   Current Activity Tolerance good   Equipment Currently Used at Home none   Fall history within last six months no   General Information   Onset of Illness/Injury or Date of Surgery 05/22/24   Referring Physician Dr. Guzman   Patient/Family Therapy Goals Statement (PT) Improve overall mobility   Pertinent History of Current Problem (include personal factors and/or comorbidities that impact the POC) s/p R TKA   Existing Precautions/Restrictions weight bearing   Weight-Bearing Status - LLE full weight-bearing   Weight-Bearing Status - RLE weight-bearing as tolerated   Range of Motion (ROM)   ROM Comment WFL, decreased RLE s/p TKA   Strength (Manual Muscle Testing)   Strength Comments WFL   Transfers   Transfers sit-stand transfer   Sit-Stand Transfer   Sit-Stand [place] (Transfers) contact guard   Assistive Device (Sit-Stand Transfers) walker, front-wheeled   Gait/Stairs (Locomotion)   [place] Level (Gait) contact guard   Assistive Device (Gait) walker, front-wheeled   Distance in Feet (Gait) 10'   Pattern (Gait) step-through   Deviations/Abnormal Patterns (Gait) umberto decreased;stride length decreased   Clinical Impression   Criteria for Skilled Therapeutic Intervention Yes, treatment indicated   PT Diagnosis (PT) impaired functional mobility   Influenced by the following impairments pain, decreased ROM, decreased strength   Functional limitations due to impairments gait, transfers   Clinical Presentation (PT Evaluation Complexity) stable   Clinical Presentation Rationale pt presents as medically diagnosed   Clinical Decision  Making (Complexity) low complexity   Planned Therapy Interventions (PT) gait training;home exercise program;patient/family education;ROM (range of motion);strengthening;transfer training   Risk & Benefits of therapy have been explained care plan/treatment goals reviewed;patient   PT Total Evaluation Time   PT Eval, Low Complexity Minutes (43401) 10   Therapy Certification   Start of care date 05/23/24   Certification date from 05/23/24   Certification date to 06/23/24   Medical Diagnosis s/p TKA   Physical Therapy Goals   PT Frequency One time eval and treatment only   PT Predicted Duration/Target Date for Goal Attainment 05/23/24   PT Goals PT Goal 1;Gait;Transfers   PT: Transfers Modified independent;Sit to/from stand;Assistive device;Goal Met   PT: Gait Modified independent;Rolling walker;150 feet;Goal Met   PT: Goal 1 Independent with TKA HEP, Goal Met   Interventions   Interventions Quick Adds Therapeutic Procedure;Therapeutic Activity;Gait Training   Therapeutic Procedure/Exercise   Ther. Procedure: strength, endurance, ROM, flexibillity Minutes (88334) 10   Symptoms Noted During/After Treatment fatigue;increased pain   Treatment Detail/Skilled Intervention TKA protocol therex x10 reps, cueing for technique, Independent with written HEP following education   Therapeutic Activity   Therapeutic Activities: dynamic activities to improve functional performance Minutes (82552) 5   Symptoms Noted During/After Treatment None   Treatment Detail/Skilled Intervention sit to/from stand, cueing for safe hand placement and LE positioning, Mod I following education   Gait Training   Gait Training Minutes (51014) 5   Symptoms Noted During/After Treatment (Gait Training) none   Treatment Detail/Skilled Intervention Pt amb well in the halls SBA with FWW, no LOB or adverse s/s, educated on walking program. Cues for direction, safety, WB.   Distance in Feet 150'   [place] Level (Gait Training) stand-by assist   Physical  Assistance Level (Gait Training) supervision;verbal cues   Weight Bearing (Gait Training) weight-bearing as tolerated   Assistive Device (Gait Training) rolling walker   Pattern Analysis (Gait Training) swing-through gait   Gait Analysis Deviations decreased umberto;decreased step length   Impairments (Gait Analysis/Training) pain;strength decreased   PT Discharge Planning   PT Plan d/c PT   PT Discharge Recommendation (DC Rec) other (see comments)  (Defer to ortho)   PT Rationale for DC Rec Mom to assist at d/c, mobilizing well, no concerns with d/c at this time.   PT Brief overview of current status SBA transfers and amb 150' with FWW   [facility]  OUTPATIENT PHYSICAL THERAPY EVALUATION  PLAN OF TREATMENT FOR OUTPATIENT REHABILITATION  (COMPLETE FOR INITIAL CLAIMS ONLY)  Patient's Last Name, First Name, M.I.  YOB: 1970  Mariela Doyle                        Provider's Name  [facility] Medical Record No.  [id number]                             Onset Date:  (P) 05/22/24   Start of Care Date:  (P) 05/23/24   Type:     _X_PT   ___OT   ___SLP Medical Diagnosis:  (P) s/p TKA              PT Diagnosis:  impaired functional mobility Visits from SOC:  1     See note for plan of treatment, functional goals and certification details    I CERTIFY THE NEED FOR THESE SERVICES FURNISHED UNDER        THIS PLAN OF TREATMENT AND WHILE UNDER MY CARE     (Physician co-signature of this document indicates review and certification of the therapy plan).

## 2024-06-17 ENCOUNTER — TRANSFERRED RECORDS (OUTPATIENT)
Dept: HEALTH INFORMATION MANAGEMENT | Facility: CLINIC | Age: 54
End: 2024-06-17
Payer: COMMERCIAL

## 2024-06-22 ENCOUNTER — TRANSFERRED RECORDS (OUTPATIENT)
Dept: HEALTH INFORMATION MANAGEMENT | Facility: CLINIC | Age: 54
End: 2024-06-22
Payer: COMMERCIAL

## 2024-06-28 ENCOUNTER — TRANSFERRED RECORDS (OUTPATIENT)
Dept: HEALTH INFORMATION MANAGEMENT | Facility: CLINIC | Age: 54
End: 2024-06-28
Payer: COMMERCIAL

## 2024-07-01 ENCOUNTER — TELEPHONE (OUTPATIENT)
Dept: FAMILY MEDICINE | Facility: CLINIC | Age: 54
End: 2024-07-01
Payer: COMMERCIAL

## 2024-07-01 NOTE — TELEPHONE ENCOUNTER
Spoke with alfredo at [facility] patient states to them she has back pain and wants this injection. An insurance referral is needed for injection  She has not seen anyone at [facility] for this and back pain is not documented in our charts.  [facility] will call patient and inform she needs to see provider prior to injection orders/insurance referral

## 2024-07-01 NOTE — TELEPHONE ENCOUNTER
Order/Referral Request    Who is requesting: [facility]    Orders being requested: Steroid Injection - [facility]    Reason service is needed/diagnosis: Pain    When are orders needed by: ASAP    Has this been discussed with Provider: No    Does patient have a preference on a Group/Provider/Facility? [facility]    Does patient have an appointment scheduled?: No    Where to send orders: Fax  [phone] [address]    Could we send this information to you in [place] or would you prefer to receive a phone call?:   No preference   Okay to leave a detailed message?: No at Other phone number:  [phone]*

## 2024-07-05 ENCOUNTER — OFFICE VISIT (OUTPATIENT)
Dept: FAMILY MEDICINE | Facility: CLINIC | Age: 54
End: 2024-07-05
Payer: COMMERCIAL

## 2024-07-05 VITALS
SYSTOLIC BLOOD PRESSURE: 137 MMHG | TEMPERATURE: 97.6 F | RESPIRATION RATE: 18 BRPM | HEIGHT: 62 IN | DIASTOLIC BLOOD PRESSURE: 82 MMHG | BODY MASS INDEX: 43.82 KG/M2 | HEART RATE: 69 BPM | OXYGEN SATURATION: 98 % | WEIGHT: 238.1 LBS

## 2024-07-05 DIAGNOSIS — M47.816 LUMBAR FACET ARTHROPATHY: Primary | ICD-10-CM

## 2024-07-05 PROCEDURE — 99213 OFFICE O/P EST LOW 20 MIN: CPT

## 2024-07-05 ASSESSMENT — ENCOUNTER SYMPTOMS: BACK PAIN: 1

## 2024-07-05 NOTE — PROGRESS NOTES
Assessment & Plan   Problem List Items Addressed This Visit       Lumbar facet arthropathy - Primary     Patient presents today at the request of her insurance to obtain a referral in order to obtain treatment for lumbar facet arthropathy. Specialty notes are unavailable today, however I was able to review the MRI of her lumbar spine which confirmed those findings. She plans to participate in regular physical therapy, which I encouraged. It sounds like she has some characteristics consistent with SI joint dysfunction of the right and she will also ask her physical therapist to address this as well. We discussed a trial of an SI belt. She has been recommended to complete an epidural steroid injection for pain relief. I will place a referral to orthopedics at [facility] today and this referral will be faxed. Patient expressed understanding of and agreement with this plan.  All questions were answered.         Relevant Orders    Spine  Referral      Subjective   Mariela is a 53 year old, presenting for the following health issues:  Back Pain (Lower back pain, Seen at [facility] and had an Xray and MRI there. Needs to get a referral to get the injection for the back pain. )        7/5/2024     1:55 PM   Additional Questions   Roomed by MS   Accompanied by Self         7/5/2024     1:55 PM   Patient Reported Additional Medications   Patient reports taking the following new medications NA     OV to discuss low back pain. She notes worsening of bilateral lumbar pain after her recent right TKO. She has intermittent paresthesias, moreso in the right leg and buttocks.   Was seen at [facility] with extreme pain. Had MRI completed which showed mild to moderate bilateral L4-L5 facet arthrorisis and synovitis. She was put on prednisone x2, started on gabapentin and muscle relaxers which have helped pain but not resolved symptoms. She continues to have intermittent exacerbations of pain. She was recommended to complete  "a steroid injection at the L4/L5, however her insurance has requested this referral for orthopedics come from her PCP.    FAX: [phone], ATTN: Dr. Payton Bright    History of Present Illness       Back Pain:  She presents for follow up of back pain. Patient's back pain is a new problem.    Original cause of back pain: other  First noticed back pain: 1-4 weeks ago  Patient feels back pain: constantlyLocation of back pain:  Right lower back, left lower back, right buttock and right hip  Description of back pain: cramping, sharp, shooting and stabbing  Back pain spreads: right buttocks, left buttocks, right thigh, right knee and right foot    Since patient first noticed back pain, pain is: always present, but gets better and worse  Does back pain interfere with her job:  Not applicable  On a scale of 1-10 (10 being the worst), patient describes pain as:  7  What makes back pain worse: bending, lying down, sitting, standing and twisting   Acupuncture: not tried  Acetaminophen: not helpful  Activity or exercise: not helpful  Chiropractor:  Not tried  Cold: not helpful  Heat: not helpful  Muscle relaxants: not helpful  NSAIDS: not helpful  Physical Therapy: not tried  Rest: not helpful  Steroid Injection: not tried  Stretching: not helpful  Surgery: not tried  TENS unit: not tried  Topical pain relievers: not helpful  Other healthcare providers patient is seeing for back pain: Other    She eats 2-3 servings of fruits and vegetables daily.She consumes 0 sweetened beverage(s) daily.She exercises with enough effort to increase her heart rate 20 to 29 minutes per day.  She exercises with enough effort to increase her heart rate 4 days per week.   She is taking medications regularly.         Objective    /82 (BP Location: Left arm, Patient Position: Sitting, Cuff Size: Adult Large)   Pulse 69   Temp 97.6  F (36.4  C) (Oral)   Resp 18   Ht 1.575 m (5' 2\")   Wt 108 kg (238 lb 1.6 oz)   SpO2 98%   " Breastfeeding No   BMI 43.55 kg/m    Body mass index is 43.55 kg/m .    Physical Exam  Vitals and nursing note reviewed.   Constitutional:       General: She is not in acute distress.     Appearance: Normal appearance.   Cardiovascular:      Rate and Rhythm: Normal rate and regular rhythm.   Pulmonary:      Effort: Pulmonary effort is normal. No respiratory distress.   Neurological:      Mental Status: She is alert.   Psychiatric:         Mood and Affect: Mood normal.         Behavior: Behavior normal.         Thought Content: Thought content normal.              Signed Electronically by: BRITTANI Lewis CNP

## 2024-07-05 NOTE — PROGRESS NOTES
Left message for Tracey with [facility] (please see 7/1/24 telephone encounter for details) requesting return call to provide further information on what referral they need from patient's PCP in order to do the steroid injection.        Zee Proctor RN  [facility]

## 2024-07-05 NOTE — ASSESSMENT & PLAN NOTE
Patient presents today at the request of her insurance to obtain a referral in order to obtain treatment for lumbar facet arthropathy. Specialty notes are unavailable today, however I was able to review the MRI of her lumbar spine which confirmed those findings. She plans to participate in regular physical therapy, which I encouraged. It sounds like she has some characteristics consistent with SI joint dysfunction of the right and she will also ask her physical therapist to address this as well. We discussed a trial of an SI belt. She has been recommended to complete an epidural steroid injection for pain relief. I will place a referral to orthopedics at [facility] today and this referral will be faxed. Patient expressed understanding of and agreement with this plan.  All questions were answered.

## 2024-07-08 ENCOUNTER — MYC MEDICAL ADVICE (OUTPATIENT)
Dept: FAMILY MEDICINE | Facility: CLINIC | Age: 54
End: 2024-07-08
Payer: COMMERCIAL

## 2024-07-08 NOTE — TELEPHONE ENCOUNTER
Spine  Referral for [facility] referral faxed 7/8/24.     Message was left for Tracey at [facility] (see 7/5/24 OV note for details) on 7/5/24 requesting return call to provide further information on what referral they need from patient's PCP in order to do the steroid injection.      Left another message for Tracey with [facility] letting them know the Spine  Referral for [facility] referral order was faxed to them this morning (7/8/24) so hopefully this will suffice for them ordering the steroid injection to be covered by patient's insurance, and requested return call if needing further assistance from PCP.      Zee Proctor RN  [facility]

## 2024-08-30 ENCOUNTER — TRANSFERRED RECORDS (OUTPATIENT)
Dept: HEALTH INFORMATION MANAGEMENT | Facility: CLINIC | Age: 54
End: 2024-08-30
Payer: COMMERCIAL

## 2024-09-17 ENCOUNTER — OFFICE VISIT (OUTPATIENT)
Dept: FAMILY MEDICINE | Facility: CLINIC | Age: 54
End: 2024-09-17
Payer: COMMERCIAL

## 2024-09-17 VITALS
HEART RATE: 72 BPM | DIASTOLIC BLOOD PRESSURE: 84 MMHG | BODY MASS INDEX: 43.98 KG/M2 | TEMPERATURE: 97.4 F | SYSTOLIC BLOOD PRESSURE: 140 MMHG | HEIGHT: 62 IN | WEIGHT: 239 LBS | RESPIRATION RATE: 16 BRPM | OXYGEN SATURATION: 100 %

## 2024-09-17 DIAGNOSIS — M48.062 SPINAL STENOSIS OF LUMBAR REGION WITH NEUROGENIC CLAUDICATION: ICD-10-CM

## 2024-09-17 DIAGNOSIS — K21.00 GASTROESOPHAGEAL REFLUX DISEASE WITH ESOPHAGITIS WITHOUT HEMORRHAGE: ICD-10-CM

## 2024-09-17 DIAGNOSIS — Z01.818 PREOP GENERAL PHYSICAL EXAM: Primary | ICD-10-CM

## 2024-09-17 LAB
ALBUMIN UR-MCNC: NEGATIVE MG/DL
APPEARANCE UR: CLEAR
BILIRUB UR QL STRIP: NEGATIVE
COLOR UR AUTO: YELLOW
ERYTHROCYTE [DISTWIDTH] IN BLOOD BY AUTOMATED COUNT: 12.7 % (ref 10–15)
GLUCOSE UR STRIP-MCNC: NEGATIVE MG/DL
HCT VFR BLD AUTO: 39.2 % (ref 35–47)
HGB BLD-MCNC: 12.7 G/DL (ref 11.7–15.7)
HGB UR QL STRIP: NEGATIVE
HOLD SPECIMEN: NORMAL
IRON BINDING CAPACITY (ROCHE): 302 UG/DL (ref 240–430)
IRON SATN MFR SERPL: 13 % (ref 15–46)
IRON SERPL-MCNC: 39 UG/DL (ref 37–145)
KETONES UR STRIP-MCNC: NEGATIVE MG/DL
LEUKOCYTE ESTERASE UR QL STRIP: NEGATIVE
MCH RBC QN AUTO: 29.4 PG (ref 26.5–33)
MCHC RBC AUTO-ENTMCNC: 32.4 G/DL (ref 31.5–36.5)
MCV RBC AUTO: 91 FL (ref 78–100)
NITRATE UR QL: NEGATIVE
PH UR STRIP: 5.5 [PH] (ref 5–8)
PLATELET # BLD AUTO: 290 10E3/UL (ref 150–450)
RBC # BLD AUTO: 4.32 10E6/UL (ref 3.8–5.2)
SP GR UR STRIP: <=1.005 (ref 1–1.03)
UROBILINOGEN UR STRIP-ACNC: 0.2 E.U./DL
WBC # BLD AUTO: 7.8 10E3/UL (ref 4–11)

## 2024-09-17 PROCEDURE — 83540 ASSAY OF IRON: CPT

## 2024-09-17 PROCEDURE — 83550 IRON BINDING TEST: CPT

## 2024-09-17 PROCEDURE — 99214 OFFICE O/P EST MOD 30 MIN: CPT

## 2024-09-17 PROCEDURE — 81003 URINALYSIS AUTO W/O SCOPE: CPT

## 2024-09-17 PROCEDURE — 85027 COMPLETE CBC AUTOMATED: CPT

## 2024-09-17 PROCEDURE — 36415 COLL VENOUS BLD VENIPUNCTURE: CPT

## 2024-09-17 PROCEDURE — G2211 COMPLEX E/M VISIT ADD ON: HCPCS

## 2024-09-17 PROCEDURE — 82728 ASSAY OF FERRITIN: CPT

## 2024-09-17 NOTE — H&P (VIEW-ONLY)
Preoperative Evaluation  [facility]  [address]  [facility] [address]  Phone: [phone]  Fax: [phone]  Primary Provider: BRITTANI Lewis CNP  Pre-op Performing Provider: BRITTANI Lewis CNP  Sep 17, 2024             9/13/2024   Surgical Information   What procedure is being done? Transforominal lumbar interbody fusion L4/5 & decompression   Facility or Hospital where procedure/surgery will be performed: [facility]   Who is doing the procedure / surgery? Alvarado velasquez MD   Date of surgery / procedure: Oct 3 2024   Time of surgery / procedure: TBD   Where do you plan to recover after surgery? at home with family        Fax number for surgical facility: Note does not need to be faxed, will be available electronically in Epic.    Assessment & Plan     The proposed surgical procedure is considered INTERMEDIATE risk.    Problem List Items Addressed This Visit       GERD (gastroesophageal reflux disease)     Currently well controlled with omeprazole as needed. She is aware she can take this medication as prescribed prior to surgery.          Preop general physical exam - Primary     Patient is aware that specific preoperative and all postoperative guidance will come from her surgical team. Reviewed NPO recommendations and antiseptic bathing. UA and CBC per surgeon. No changes to medication; she will avoid OTC ibuprofen for 7 days. Blood pressure is slightly elevated today, however historically her blood pressure has always been under excellent control. She is aware it will need appropriate for anesthesia on the day of her procedure. All questions were answered.         Relevant Orders    CBC with Platelets and Reflex to Iron Studies    UA Macroscopic with reflex to Microscopic and Culture - Clinic Collect (Completed)    Iron & Iron Binding Capacity    Ferritin    Spinal stenosis of lumbar region with neurogenic claudication     Indication for  planned procedure. Speciality notes reviewed today. Surgery has been recommended given failure to respond to conservative cares including physical therapy and injections.            - No identified additional risk factors other than previously addressed    Preoperative Medication Instructions  Antiplatelet or Anticoagulation Medication Instructions   - Patient is on no antiplatelet or anticoagulation medications.    Additional Medication Instructions  Take all scheduled medications on the day of surgery EXCEPT for modifications listed below:   - Herbal medications and vitamins: DO NOT TAKE 14 days prior to surgery.   - ibuprofen (Advil, Motrin): DO NOT TAKE 7 day before surgery.     Recommendation  Approval given to proceed with proposed procedure, without further diagnostic evaluation.    The longitudinal plan of care for the diagnosis(es)/condition(s) as documented were addressed during this visit. Due to the added complexity in care, I will continue to support Mariela in the subsequent management and with ongoing continuity of care.    Razia Sierra is a 53 year old, presenting for the following:  Pre-Op Exam (Transforminal Lumber fusion L4-5 and decompression)          9/17/2024     2:13 PM   Additional Questions   Roomed by sac   Accompanied by self         9/17/2024     2:13 PM   Patient Reported Additional Medications   Patient reports taking the following new medications no     HPI related to upcoming procedure: Low back pain began earlier this year. Surgery has been recommended given failure to respond to conservative cares including physical therapy and injections.        9/13/2024   Pre-Op Questionnaire   Have you ever had a heart attack or stroke? No   Have you ever had surgery on your heart or blood vessels, such as a stent placement, a coronary artery bypass, or surgery on an artery in your head, neck, heart, or legs? No   Do you have chest pain with activity? No   Do you have a history of  heart failure? No   Do you currently have a cold, bronchitis or symptoms of other infection? No   Do you have a cough, shortness of breath, or wheezing? No   Do you or anyone in your family have previous history of blood clots? (!) YES - sister with DVT (thought to be provoked by air travel)   Do you or does anyone in your family have a serious bleeding problem such as prolonged bleeding following surgeries or cuts? No   Have you ever had problems with anemia or been told to take iron pills? No   Have you had any abnormal blood loss such as black, tarry or bloody stools, or abnormal vaginal bleeding? No   Have you ever had a blood transfusion? No   Are you willing to have a blood transfusion if it is medically needed before, during, or after your surgery? Yes   Have you or any of your relatives ever had problems with anesthesia? No   Do you have sleep apnea, excessive snoring or daytime drowsiness? No   Do you have any artifical heart valves or other implanted medical devices like a pacemaker, defibrillator, or continuous glucose monitor? No   Do you have artificial joints? (!) UNKNOWN   Are you allergic to latex? No        Health Care Directive  Patient does not have a Health Care Directive or Living Will: Discussed advance care planning with patient; however, patient declined at this time.    Preoperative Review of    reviewed - no record of controlled substances prescribed.      Status of Chronic Conditions:  See problem list for active medical problems.  Problems all longstanding and stable, except as noted/documented.  See ROS for pertinent symptoms related to these conditions.    Patient Active Problem List    Diagnosis Date Noted    Spinal stenosis of lumbar region with neurogenic claudication 09/17/2024     Priority: Medium    Lumbar facet arthropathy 07/05/2024     Priority: Medium    History of total knee arthroplasty, left 05/22/2024     Priority: Medium    Preop general physical exam 05/02/2024      Priority: Medium    Benign neoplasm of ascending colon 04/12/2024     Priority: Medium    Hemorrhoids 04/10/2024     Priority: Medium    Routine general medical examination at a health care facility 03/28/2024     Priority: Medium    Class 3 severe obesity due to excess calories without serious comorbidity with body mass index (BMI) of 40.0 to 44.9 in adult (H) 03/28/2024     Priority: Medium    History of colonic polyps 03/28/2024     Priority: Medium    History of right knee joint replacement 05/14/2014     Priority: Medium    GERD (gastroesophageal reflux disease) 10/31/2011     Priority: Medium      Past Medical History:   Diagnosis Date    Arthritis     Elevated blood pressure reading in office without diagnosis of hypertension 01/02/2019    Gastroesophageal reflux disease     Obese      Past Surgical History:   Procedure Laterality Date    ABDOMEN SURGERY  1971    Pyloric stenosis    ARTHROPLASTY KNEE Right 5/22/2024    Procedure: RIGHT TOTAL KNEE ARTHROPLASTY;  Surgeon: Arron Guzman MD;  Location: [facility] Main OR    COLONOSCOPY      EYE SURGERY Bilateral     Lasik     Current Outpatient Medications   Medication Sig Dispense Refill    omeprazole (PRILOSEC) 20 MG DR capsule Take 20 mg by mouth daily as needed      valACYclovir (VALTREX) 1000 mg tablet Take 1 tablet by mouth daily as needed. Prn cold sores       No Known Allergies     Social History     Tobacco Use    Smoking status: Never     Passive exposure: Never    Smokeless tobacco: Never    Tobacco comments:     Quit [facility] before knee surgery    Substance Use Topics    Alcohol use: Yes     Comment: 4-10 drinks per week     History   Drug Use Unknown         Review of Systems  Constitutional, HEENT, cardiovascular, pulmonary, gi and gu systems are negative, except as otherwise noted.    Objective    BP (!) 140/84 (BP Location: Left arm, Patient Position: Sitting, Cuff Size: Adult Large)   Pulse 72   Temp 97.4  F (36.3  C) (Oral)    "Resp 16   Ht 1.575 m (5' 2\")   Wt 108.4 kg (239 lb)   SpO2 100%   BMI 43.71 kg/m     Estimated body mass index is 43.71 kg/m  as calculated from the following:    Height as of this encounter: 1.575 m (5' 2\").    Weight as of this encounter: 108.4 kg (239 lb).    Physical Exam  GENERAL: alert and no distress  EYES: Eyes grossly normal to inspection, PERRL and conjunctivae and sclerae normal  HENT: ear canals and TM's normal, nose and mouth without ulcers or lesions  NECK: no adenopathy, no asymmetry, masses, or scars  RESP: lungs clear to auscultation - no rales, rhonchi or wheezes  CV: regular rate and rhythm, normal S1 S2, no S3 or S4, no murmur, click or rub, no peripheral edema  ABDOMEN: soft, nontender, no hepatosplenomegaly, no masses and bowel sounds normal  MS: no gross musculoskeletal defects noted, no edema  SKIN: no suspicious lesions or rashes  NEURO: Normal strength and tone, mentation intact and speech normal  PSYCH: mentation appears normal, affect normal/bright    Recent Labs   Lab Test 05/23/24  0538 05/02/24  1623 03/28/24  1628 03/05/24  1806   HGB 10.1* 12.5  --  13.4   PLT  --  296  --  321   NA  --   --   --  138   POTASSIUM  --   --   --  4.6   CR  --   --   --  0.93   A1C  --   --  5.7*  --       Diagnostics  Recent Results (from the past 24 hour(s))   UA Macroscopic with reflex to Microscopic and Culture - Clinic Collect    Collection Time: 09/17/24  2:39 PM    Specimen: Urine, Midstream   Result Value Ref Range    Color Urine Yellow Colorless, Straw, Light Yellow, Yellow    Appearance Urine Clear Clear    Glucose Urine Negative Negative mg/dL    Bilirubin Urine Negative Negative    Ketones Urine Negative Negative mg/dL    Specific Gravity Urine <=1.005 1.005 - 1.030    Blood Urine Negative Negative    pH Urine 5.5 5.0 - 8.0    Protein Albumin Urine Negative Negative mg/dL    Urobilinogen Urine 0.2 0.2, 1.0 E.U./dL    Nitrite Urine Negative Negative    Leukocyte Esterase Urine Negative " Negative   CBC with Platelets and Reflex to Iron Studies    Collection Time: 09/17/24  2:39 PM   Result Value Ref Range    WBC Count 7.8 4.0 - 11.0 10e3/uL    RBC Count 4.32 3.80 - 5.20 10e6/uL    Hemoglobin 12.7 11.7 - 15.7 g/dL    Hematocrit 39.2 35.0 - 47.0 %    MCV 91 78 - 100 fL    MCH 29.4 26.5 - 33.0 pg    MCHC 32.4 31.5 - 36.5 g/dL    RDW 12.7 10.0 - 15.0 %    Platelet Count 290 150 - 450 10e3/uL      No EKG required, no history of coronary heart disease, significant arrhythmia, peripheral arterial disease or other structural heart disease.    Revised Cardiac Risk Index (RCRI)  The patient has the following serious cardiovascular risks for perioperative complications:   - No serious cardiac risks = 0 points     RCRI Interpretation: 0 points: Class I (very low risk - 0.4% complication rate)         Signed Electronically by: BRITTANI Lewis CNP  A copy of this evaluation report is provided to the requesting physician.

## 2024-09-17 NOTE — ASSESSMENT & PLAN NOTE
Indication for planned procedure. Speciality notes reviewed today. Surgery has been recommended given failure to respond to conservative cares including physical therapy and injections.

## 2024-09-17 NOTE — PATIENT INSTRUCTIONS

## 2024-09-17 NOTE — PROGRESS NOTES
Preoperative Evaluation  [facility]  [address]  [facility] [address]  Phone: [phone]  Fax: [phone]  Primary Provider: BRITTANI Lewis CNP  Pre-op Performing Provider: BRITTANI Lewis CNP  Sep 17, 2024             9/13/2024   Surgical Information   What procedure is being done? Transforominal lumbar interbody fusion L4/5 & decompression   Facility or Hospital where procedure/surgery will be performed: [facility]   Who is doing the procedure / surgery? Alvarado velasquez MD   Date of surgery / procedure: Oct 3 2024   Time of surgery / procedure: TBD   Where do you plan to recover after surgery? at home with family        Fax number for surgical facility: Note does not need to be faxed, will be available electronically in Epic.    Assessment & Plan     The proposed surgical procedure is considered INTERMEDIATE risk.    Problem List Items Addressed This Visit       GERD (gastroesophageal reflux disease)     Currently well controlled with omeprazole as needed. She is aware she can take this medication as prescribed prior to surgery.          Preop general physical exam - Primary     Patient is aware that specific preoperative and all postoperative guidance will come from her surgical team. Reviewed NPO recommendations and antiseptic bathing. UA and CBC per surgeon. No changes to medication; she will avoid OTC ibuprofen for 7 days. Blood pressure is slightly elevated today, however historically her blood pressure has always been under excellent control. She is aware it will need appropriate for anesthesia on the day of her procedure. All questions were answered.         Relevant Orders    CBC with Platelets and Reflex to Iron Studies    UA Macroscopic with reflex to Microscopic and Culture - Clinic Collect (Completed)    Iron & Iron Binding Capacity    Ferritin    Spinal stenosis of lumbar region with neurogenic claudication     Indication for  planned procedure. Speciality notes reviewed today. Surgery has been recommended given failure to respond to conservative cares including physical therapy and injections.            - No identified additional risk factors other than previously addressed    Preoperative Medication Instructions  Antiplatelet or Anticoagulation Medication Instructions   - Patient is on no antiplatelet or anticoagulation medications.    Additional Medication Instructions  Take all scheduled medications on the day of surgery EXCEPT for modifications listed below:   - Herbal medications and vitamins: DO NOT TAKE 14 days prior to surgery.   - ibuprofen (Advil, Motrin): DO NOT TAKE 7 day before surgery.     Recommendation  Approval given to proceed with proposed procedure, without further diagnostic evaluation.    The longitudinal plan of care for the diagnosis(es)/condition(s) as documented were addressed during this visit. Due to the added complexity in care, I will continue to support Mariela in the subsequent management and with ongoing continuity of care.    Razia Sierra is a 53 year old, presenting for the following:  Pre-Op Exam (Transforminal Lumber fusion L4-5 and decompression)          9/17/2024     2:13 PM   Additional Questions   Roomed by sac   Accompanied by self         9/17/2024     2:13 PM   Patient Reported Additional Medications   Patient reports taking the following new medications no     HPI related to upcoming procedure: Low back pain began earlier this year. Surgery has been recommended given failure to respond to conservative cares including physical therapy and injections.        9/13/2024   Pre-Op Questionnaire   Have you ever had a heart attack or stroke? No   Have you ever had surgery on your heart or blood vessels, such as a stent placement, a coronary artery bypass, or surgery on an artery in your head, neck, heart, or legs? No   Do you have chest pain with activity? No   Do you have a history of  heart failure? No   Do you currently have a cold, bronchitis or symptoms of other infection? No   Do you have a cough, shortness of breath, or wheezing? No   Do you or anyone in your family have previous history of blood clots? (!) YES - sister with DVT (thought to be provoked by air travel)   Do you or does anyone in your family have a serious bleeding problem such as prolonged bleeding following surgeries or cuts? No   Have you ever had problems with anemia or been told to take iron pills? No   Have you had any abnormal blood loss such as black, tarry or bloody stools, or abnormal vaginal bleeding? No   Have you ever had a blood transfusion? No   Are you willing to have a blood transfusion if it is medically needed before, during, or after your surgery? Yes   Have you or any of your relatives ever had problems with anesthesia? No   Do you have sleep apnea, excessive snoring or daytime drowsiness? No   Do you have any artifical heart valves or other implanted medical devices like a pacemaker, defibrillator, or continuous glucose monitor? No   Do you have artificial joints? (!) UNKNOWN   Are you allergic to latex? No        Health Care Directive  Patient does not have a Health Care Directive or Living Will: Discussed advance care planning with patient; however, patient declined at this time.    Preoperative Review of    reviewed - no record of controlled substances prescribed.      Status of Chronic Conditions:  See problem list for active medical problems.  Problems all longstanding and stable, except as noted/documented.  See ROS for pertinent symptoms related to these conditions.    Patient Active Problem List    Diagnosis Date Noted    Spinal stenosis of lumbar region with neurogenic claudication 09/17/2024     Priority: Medium    Lumbar facet arthropathy 07/05/2024     Priority: Medium    History of total knee arthroplasty, left 05/22/2024     Priority: Medium    Preop general physical exam 05/02/2024      Priority: Medium    Benign neoplasm of ascending colon 04/12/2024     Priority: Medium    Hemorrhoids 04/10/2024     Priority: Medium    Routine general medical examination at a health care facility 03/28/2024     Priority: Medium    Class 3 severe obesity due to excess calories without serious comorbidity with body mass index (BMI) of 40.0 to 44.9 in adult (H) 03/28/2024     Priority: Medium    History of colonic polyps 03/28/2024     Priority: Medium    History of right knee joint replacement 05/14/2014     Priority: Medium    GERD (gastroesophageal reflux disease) 10/31/2011     Priority: Medium      Past Medical History:   Diagnosis Date    Arthritis     Elevated blood pressure reading in office without diagnosis of hypertension 01/02/2019    Gastroesophageal reflux disease     Obese      Past Surgical History:   Procedure Laterality Date    ABDOMEN SURGERY  1971    Pyloric stenosis    ARTHROPLASTY KNEE Right 5/22/2024    Procedure: RIGHT TOTAL KNEE ARTHROPLASTY;  Surgeon: Arrno Guzman MD;  Location: [facility] Main OR    COLONOSCOPY      EYE SURGERY Bilateral     Lasik     Current Outpatient Medications   Medication Sig Dispense Refill    omeprazole (PRILOSEC) 20 MG DR capsule Take 20 mg by mouth daily as needed      valACYclovir (VALTREX) 1000 mg tablet Take 1 tablet by mouth daily as needed. Prn cold sores       No Known Allergies     Social History     Tobacco Use    Smoking status: Never     Passive exposure: Never    Smokeless tobacco: Never    Tobacco comments:     Quit [employer] before knee surgery    Substance Use Topics    Alcohol use: Yes     Comment: 4-10 drinks per week     History   Drug Use Unknown         Review of Systems  Constitutional, HEENT, cardiovascular, pulmonary, gi and gu systems are negative, except as otherwise noted.    Objective    BP (!) 140/84 (BP Location: Left arm, Patient Position: Sitting, Cuff Size: Adult Large)   Pulse 72   Temp 97.4  F (36.3  C) (Oral)    "Resp 16   Ht 1.575 m (5' 2\")   Wt 108.4 kg (239 lb)   SpO2 100%   BMI 43.71 kg/m     Estimated body mass index is 43.71 kg/m  as calculated from the following:    Height as of this encounter: 1.575 m (5' 2\").    Weight as of this encounter: 108.4 kg (239 lb).    Physical Exam  GENERAL: alert and no distress  EYES: Eyes grossly normal to inspection, PERRL and conjunctivae and sclerae normal  HENT: ear canals and TM's normal, nose and mouth without ulcers or lesions  NECK: no adenopathy, no asymmetry, masses, or scars  RESP: lungs clear to auscultation - no rales, rhonchi or wheezes  CV: regular rate and rhythm, normal S1 S2, no S3 or S4, no murmur, click or rub, no peripheral edema  ABDOMEN: soft, nontender, no hepatosplenomegaly, no masses and bowel sounds normal  MS: no gross musculoskeletal defects noted, no edema  SKIN: no suspicious lesions or rashes  NEURO: Normal strength and tone, mentation intact and speech normal  PSYCH: mentation appears normal, affect normal/bright    Recent Labs   Lab Test 05/23/24  0538 05/02/24  1623 03/28/24  1628 03/05/24  1806   HGB 10.1* 12.5  --  13.4   PLT  --  296  --  321   NA  --   --   --  138   POTASSIUM  --   --   --  4.6   CR  --   --   --  0.93   A1C  --   --  5.7*  --       Diagnostics  Recent Results (from the past 24 hour(s))   UA Macroscopic with reflex to Microscopic and Culture - Clinic Collect    Collection Time: 09/17/24  2:39 PM    Specimen: Urine, Midstream   Result Value Ref Range    Color Urine Yellow Colorless, Straw, Light Yellow, Yellow    Appearance Urine Clear Clear    Glucose Urine Negative Negative mg/dL    Bilirubin Urine Negative Negative    Ketones Urine Negative Negative mg/dL    Specific Gravity Urine <=1.005 1.005 - 1.030    Blood Urine Negative Negative    pH Urine 5.5 5.0 - 8.0    Protein Albumin Urine Negative Negative mg/dL    Urobilinogen Urine 0.2 0.2, 1.0 E.U./dL    Nitrite Urine Negative Negative    Leukocyte Esterase Urine Negative " Negative   CBC with Platelets and Reflex to Iron Studies    Collection Time: 09/17/24  2:39 PM   Result Value Ref Range    WBC Count 7.8 4.0 - 11.0 10e3/uL    RBC Count 4.32 3.80 - 5.20 10e6/uL    Hemoglobin 12.7 11.7 - 15.7 g/dL    Hematocrit 39.2 35.0 - 47.0 %    MCV 91 78 - 100 fL    MCH 29.4 26.5 - 33.0 pg    MCHC 32.4 31.5 - 36.5 g/dL    RDW 12.7 10.0 - 15.0 %    Platelet Count 290 150 - 450 10e3/uL      No EKG required, no history of coronary heart disease, significant arrhythmia, peripheral arterial disease or other structural heart disease.    Revised Cardiac Risk Index (RCRI)  The patient has the following serious cardiovascular risks for perioperative complications:   - No serious cardiac risks = 0 points     RCRI Interpretation: 0 points: Class I (very low risk - 0.4% complication rate)         Signed Electronically by: BRITTANI Lewis CNP  A copy of this evaluation report is provided to the requesting physician.

## 2024-09-17 NOTE — ASSESSMENT & PLAN NOTE
Patient is aware that specific preoperative and all postoperative guidance will come from her surgical team. Reviewed NPO recommendations and antiseptic bathing. UA and CBC per surgeon. No changes to medication; she will avoid OTC ibuprofen for 7 days. Blood pressure is slightly elevated today, however historically her blood pressure has always been under excellent control. She is aware it will need appropriate for anesthesia on the day of her procedure. All questions were answered.

## 2024-09-18 LAB — FERRITIN SERPL-MCNC: 96 NG/ML (ref 11–328)

## 2024-10-03 ENCOUNTER — APPOINTMENT (OUTPATIENT)
Dept: RADIOLOGY | Facility: CLINIC | Age: 54
End: 2024-10-03
Attending: ORTHOPAEDIC SURGERY
Payer: COMMERCIAL

## 2024-10-03 ENCOUNTER — ANESTHESIA (OUTPATIENT)
Dept: SURGERY | Facility: CLINIC | Age: 54
End: 2024-10-03
Payer: COMMERCIAL

## 2024-10-03 ENCOUNTER — HOSPITAL ENCOUNTER (INPATIENT)
Facility: CLINIC | Age: 54
LOS: 2 days | Discharge: HOME | End: 2024-10-05
Attending: ORTHOPAEDIC SURGERY | Admitting: ORTHOPAEDIC SURGERY
Payer: COMMERCIAL

## 2024-10-03 ENCOUNTER — ANESTHESIA EVENT (OUTPATIENT)
Dept: SURGERY | Facility: CLINIC | Age: 54
End: 2024-10-03
Payer: COMMERCIAL

## 2024-10-03 ENCOUNTER — TRANSFERRED RECORDS (OUTPATIENT)
Dept: HEALTH INFORMATION MANAGEMENT | Facility: CLINIC | Age: 54
End: 2024-10-03

## 2024-10-03 DIAGNOSIS — G89.18 ACUTE POST-OPERATIVE PAIN: ICD-10-CM

## 2024-10-03 DIAGNOSIS — M43.16 SPONDYLOLISTHESIS AT L4-L5 LEVEL: Primary | ICD-10-CM

## 2024-10-03 PROBLEM — M48.061 SPINAL STENOSIS AT L4-L5 LEVEL: Status: ACTIVE | Noted: 2024-10-03

## 2024-10-03 LAB
ABO/RH(D): NORMAL
ANTIBODY SCREEN: NEGATIVE
GLUCOSE BLDC GLUCOMTR-MCNC: 76 MG/DL (ref 70–99)
HGB BLD-MCNC: 11.9 G/DL (ref 11.7–15.7)
SPECIMEN EXPIRATION DATE: NORMAL

## 2024-10-03 PROCEDURE — 710N000010 HC RECOVERY PHASE 1, LEVEL 2, PER MIN: Performed by: ORTHOPAEDIC SURGERY

## 2024-10-03 PROCEDURE — 250N000011 HC RX IP 250 OP 636: Performed by: PHYSICIAN ASSISTANT

## 2024-10-03 PROCEDURE — 250N000011 HC RX IP 250 OP 636

## 2024-10-03 PROCEDURE — C1713 ANCHOR/SCREW BN/BN,TIS/BN: HCPCS | Performed by: ORTHOPAEDIC SURGERY

## 2024-10-03 PROCEDURE — 250N000009 HC RX 250: Performed by: PHYSICIAN ASSISTANT

## 2024-10-03 PROCEDURE — 250N000011 HC RX IP 250 OP 636: Performed by: NURSE ANESTHETIST, CERTIFIED REGISTERED

## 2024-10-03 PROCEDURE — 250N000005 HC OR RX SURGIFLO HEMOSTATIC MATRIX 10ML 199102S OPNP: Performed by: ORTHOPAEDIC SURGERY

## 2024-10-03 PROCEDURE — 86850 RBC ANTIBODY SCREEN: CPT | Performed by: PHYSICIAN ASSISTANT

## 2024-10-03 PROCEDURE — 250N000009 HC RX 250: Performed by: ORTHOPAEDIC SURGERY

## 2024-10-03 PROCEDURE — 0SG00AJ FUSION OF LUMBAR VERTEBRAL JOINT WITH INTERBODY FUSION DEVICE, POSTERIOR APPROACH, ANTERIOR COLUMN, OPEN APPROACH: ICD-10-PCS | Performed by: ORTHOPAEDIC SURGERY

## 2024-10-03 PROCEDURE — 250N000013 HC RX MED GY IP 250 OP 250 PS 637

## 2024-10-03 PROCEDURE — 4A1104G MONITORING OF PERIPHERAL NERVOUS ELECTRICAL ACTIVITY, INTRAOPERATIVE, OPEN APPROACH: ICD-10-PCS | Performed by: ORTHOPAEDIC SURGERY

## 2024-10-03 PROCEDURE — 86900 BLOOD TYPING SEROLOGIC ABO: CPT | Performed by: PHYSICIAN ASSISTANT

## 2024-10-03 PROCEDURE — 250N000013 HC RX MED GY IP 250 OP 250 PS 637: Performed by: ORTHOPAEDIC SURGERY

## 2024-10-03 PROCEDURE — 250N000025 HC SEVOFLURANE, PER MIN: Performed by: ORTHOPAEDIC SURGERY

## 2024-10-03 PROCEDURE — 120N000001 HC R&B MED SURG/OB

## 2024-10-03 PROCEDURE — 370N000017 HC ANESTHESIA TECHNICAL FEE, PER MIN: Performed by: ORTHOPAEDIC SURGERY

## 2024-10-03 PROCEDURE — 999N000182 XR SURGERY OARM: Mod: TC

## 2024-10-03 PROCEDURE — 85018 HEMOGLOBIN: CPT | Performed by: ORTHOPAEDIC SURGERY

## 2024-10-03 PROCEDURE — 999N000141 HC STATISTIC PRE-PROCEDURE NURSING ASSESSMENT: Performed by: ORTHOPAEDIC SURGERY

## 2024-10-03 PROCEDURE — 250N000013 HC RX MED GY IP 250 OP 250 PS 637: Performed by: PHYSICIAN ASSISTANT

## 2024-10-03 PROCEDURE — 0ST20ZZ RESECTION OF LUMBAR VERTEBRAL DISC, OPEN APPROACH: ICD-10-PCS | Performed by: ORTHOPAEDIC SURGERY

## 2024-10-03 PROCEDURE — 258N000003 HC RX IP 258 OP 636: Performed by: ORTHOPAEDIC SURGERY

## 2024-10-03 PROCEDURE — C1821 INTERSPINOUS IMPLANT: HCPCS | Performed by: ORTHOPAEDIC SURGERY

## 2024-10-03 PROCEDURE — 258N000003 HC RX IP 258 OP 636: Performed by: ANESTHESIOLOGY

## 2024-10-03 PROCEDURE — 250N000011 HC RX IP 250 OP 636: Performed by: ANESTHESIOLOGY

## 2024-10-03 PROCEDURE — 07DR3ZZ EXTRACTION OF ILIAC BONE MARROW, PERCUTANEOUS APPROACH: ICD-10-PCS | Performed by: ORTHOPAEDIC SURGERY

## 2024-10-03 PROCEDURE — 36415 COLL VENOUS BLD VENIPUNCTURE: CPT | Performed by: PHYSICIAN ASSISTANT

## 2024-10-03 PROCEDURE — 250N000009 HC RX 250: Performed by: ANESTHESIOLOGY

## 2024-10-03 PROCEDURE — C1762 CONN TISS, HUMAN(INC FASCIA): HCPCS | Performed by: ORTHOPAEDIC SURGERY

## 2024-10-03 PROCEDURE — 8E0WXBF COMPUTER ASSISTED PROCEDURE OF TRUNK REGION, WITH FLUOROSCOPY: ICD-10-PCS | Performed by: ORTHOPAEDIC SURGERY

## 2024-10-03 PROCEDURE — 360N000086 HC SURGERY LEVEL 6 W/ FLUORO, PER MIN: Performed by: ORTHOPAEDIC SURGERY

## 2024-10-03 PROCEDURE — 999N000182 XR SURGERY CARM FLUORO GREATER THAN 5 MIN: Mod: TC

## 2024-10-03 PROCEDURE — 36415 COLL VENOUS BLD VENIPUNCTURE: CPT | Performed by: ORTHOPAEDIC SURGERY

## 2024-10-03 PROCEDURE — 250N000011 HC RX IP 250 OP 636: Performed by: ORTHOPAEDIC SURGERY

## 2024-10-03 PROCEDURE — 272N000001 HC OR GENERAL SUPPLY STERILE: Performed by: ORTHOPAEDIC SURGERY

## 2024-10-03 PROCEDURE — 0SG00K1 FUSION OF LUMBAR VERTEBRAL JOINT WITH NONAUTOLOGOUS TISSUE SUBSTITUTE, POSTERIOR APPROACH, POSTERIOR COLUMN, OPEN APPROACH: ICD-10-PCS | Performed by: ORTHOPAEDIC SURGERY

## 2024-10-03 DEVICE — IMP ROD MEDT SOLERA CVD 5.5X35MM TI 1553201035: Type: IMPLANTABLE DEVICE | Site: SPINE LUMBAR | Status: FUNCTIONAL

## 2024-10-03 DEVICE — IMP ROD MEDT SOLERA CVD 5.5X30MM TI 1553201030: Type: IMPLANTABLE DEVICE | Site: SPINE LUMBAR | Status: FUNCTIONAL

## 2024-10-03 DEVICE — SPACER 84332411 ADAPTIX 24MM X 11MM
Type: IMPLANTABLE DEVICE | Site: SPINE LUMBAR | Status: FUNCTIONAL
Brand: ADAPTIX™ INTERBODY SYSTEM WITH TITAN NANOLOCK™ SURFACE TECHNOLOGY

## 2024-10-03 DEVICE — GRAFT BONE FIBERS DBF 9ML INJ T50309 PRELOADED: Type: IMPLANTABLE DEVICE | Site: SPINE LUMBAR | Status: FUNCTIONAL

## 2024-10-03 DEVICE — MAGNETOS PUTTY 10CC 1-2MM USA
Type: IMPLANTABLE DEVICE | Site: SPINE LUMBAR | Status: FUNCTIONAL
Brand: MAGNETOS

## 2024-10-03 DEVICE — MAS/SS PK 559200029 STERILE 5.5/6.0 4PK
Type: IMPLANTABLE DEVICE | Site: SPINE LUMBAR | Status: FUNCTIONAL
Brand: CD HORIZON™ MODULEX™ SPINAL SYSTEM

## 2024-10-03 RX ORDER — BUPIVACAINE HYDROCHLORIDE AND EPINEPHRINE 2.5; 5 MG/ML; UG/ML
2 INJECTION, SOLUTION EPIDURAL; INFILTRATION; INTRACAUDAL; PERINEURAL ONCE
Status: DISCONTINUED | OUTPATIENT
Start: 2024-10-03 | End: 2024-10-03 | Stop reason: HOSPADM

## 2024-10-03 RX ORDER — NALOXONE HYDROCHLORIDE 0.4 MG/ML
0.4 INJECTION, SOLUTION INTRAMUSCULAR; INTRAVENOUS; SUBCUTANEOUS
Status: DISCONTINUED | OUTPATIENT
Start: 2024-10-03 | End: 2024-10-05 | Stop reason: HOSPADM

## 2024-10-03 RX ORDER — BUPIVACAINE HYDROCHLORIDE AND EPINEPHRINE 2.5; 5 MG/ML; UG/ML
INJECTION, SOLUTION EPIDURAL; INFILTRATION; INTRACAUDAL; PERINEURAL PRN
Status: DISCONTINUED | OUTPATIENT
Start: 2024-10-03 | End: 2024-10-05 | Stop reason: HOSPADM

## 2024-10-03 RX ORDER — PROCHLORPERAZINE MALEATE 10 MG
10 TABLET ORAL EVERY 6 HOURS PRN
Status: DISCONTINUED | OUTPATIENT
Start: 2024-10-03 | End: 2024-10-05 | Stop reason: HOSPADM

## 2024-10-03 RX ORDER — METHOCARBAMOL 750 MG/1
750 TABLET, FILM COATED ORAL EVERY 6 HOURS PRN
Status: DISCONTINUED | OUTPATIENT
Start: 2024-10-03 | End: 2024-10-05 | Stop reason: HOSPADM

## 2024-10-03 RX ORDER — SODIUM CHLORIDE, SODIUM LACTATE, POTASSIUM CHLORIDE, CALCIUM CHLORIDE 600; 310; 30; 20 MG/100ML; MG/100ML; MG/100ML; MG/100ML
INJECTION, SOLUTION INTRAVENOUS CONTINUOUS
Status: DISCONTINUED | OUTPATIENT
Start: 2024-10-03 | End: 2024-10-03 | Stop reason: HOSPADM

## 2024-10-03 RX ORDER — HYDROMORPHONE HCL IN WATER/PF 6 MG/30 ML
0.2 PATIENT CONTROLLED ANALGESIA SYRINGE INTRAVENOUS EVERY 5 MIN PRN
Status: DISCONTINUED | OUTPATIENT
Start: 2024-10-03 | End: 2024-10-03 | Stop reason: HOSPADM

## 2024-10-03 RX ORDER — DEXAMETHASONE SODIUM PHOSPHATE 4 MG/ML
4 INJECTION, SOLUTION INTRA-ARTICULAR; INTRALESIONAL; INTRAMUSCULAR; INTRAVENOUS; SOFT TISSUE
Status: DISCONTINUED | OUTPATIENT
Start: 2024-10-03 | End: 2024-10-03 | Stop reason: HOSPADM

## 2024-10-03 RX ORDER — GABAPENTIN 300 MG/1
300 CAPSULE ORAL
Status: COMPLETED | OUTPATIENT
Start: 2024-10-03 | End: 2024-10-03

## 2024-10-03 RX ORDER — KETAMINE HYDROCHLORIDE 10 MG/ML
INJECTION INTRAMUSCULAR; INTRAVENOUS PRN
Status: DISCONTINUED | OUTPATIENT
Start: 2024-10-03 | End: 2024-10-03

## 2024-10-03 RX ORDER — MULTIVITAMIN,THERAPEUTIC
1 TABLET ORAL DAILY
Status: DISCONTINUED | OUTPATIENT
Start: 2024-10-04 | End: 2024-10-05 | Stop reason: HOSPADM

## 2024-10-03 RX ORDER — LORATADINE 10 MG/1
10 TABLET ORAL DAILY PRN
Status: DISCONTINUED | OUTPATIENT
Start: 2024-10-03 | End: 2024-10-05 | Stop reason: HOSPADM

## 2024-10-03 RX ORDER — FENTANYL CITRATE 50 UG/ML
INJECTION, SOLUTION INTRAMUSCULAR; INTRAVENOUS PRN
Status: DISCONTINUED | OUTPATIENT
Start: 2024-10-03 | End: 2024-10-03

## 2024-10-03 RX ORDER — HYDROXYZINE HYDROCHLORIDE 50 MG/ML
25 INJECTION, SOLUTION INTRAMUSCULAR EVERY 4 HOURS PRN
Status: DISCONTINUED | OUTPATIENT
Start: 2024-10-03 | End: 2024-10-05 | Stop reason: HOSPADM

## 2024-10-03 RX ORDER — BISACODYL 10 MG
10 SUPPOSITORY, RECTAL RECTAL DAILY PRN
Status: DISCONTINUED | OUTPATIENT
Start: 2024-10-06 | End: 2024-10-05 | Stop reason: HOSPADM

## 2024-10-03 RX ORDER — AMOXICILLIN 250 MG
1-2 CAPSULE ORAL 2 TIMES DAILY
Qty: 60 TABLET | Refills: 0 | Status: SHIPPED | OUTPATIENT
Start: 2024-10-03

## 2024-10-03 RX ORDER — CEFAZOLIN SODIUM 2 G/100ML
2 INJECTION, SOLUTION INTRAVENOUS EVERY 8 HOURS
Status: COMPLETED | OUTPATIENT
Start: 2024-10-03 | End: 2024-10-04

## 2024-10-03 RX ORDER — ONDANSETRON 2 MG/ML
INJECTION INTRAMUSCULAR; INTRAVENOUS PRN
Status: DISCONTINUED | OUTPATIENT
Start: 2024-10-03 | End: 2024-10-03

## 2024-10-03 RX ORDER — HYDROMORPHONE HYDROCHLORIDE 2 MG/1
2 TABLET ORAL EVERY 4 HOURS PRN
Status: DISCONTINUED | OUTPATIENT
Start: 2024-10-03 | End: 2024-10-05 | Stop reason: HOSPADM

## 2024-10-03 RX ORDER — ONDANSETRON 4 MG/1
4 TABLET, ORALLY DISINTEGRATING ORAL EVERY 6 HOURS PRN
Status: DISCONTINUED | OUTPATIENT
Start: 2024-10-03 | End: 2024-10-05 | Stop reason: HOSPADM

## 2024-10-03 RX ORDER — POLYETHYLENE GLYCOL 3350 17 G/17G
17 POWDER, FOR SOLUTION ORAL DAILY
Status: DISCONTINUED | OUTPATIENT
Start: 2024-10-04 | End: 2024-10-05 | Stop reason: HOSPADM

## 2024-10-03 RX ORDER — PROPOFOL 10 MG/ML
INJECTION, EMULSION INTRAVENOUS CONTINUOUS PRN
Status: DISCONTINUED | OUTPATIENT
Start: 2024-10-03 | End: 2024-10-03

## 2024-10-03 RX ORDER — ACETAMINOPHEN 325 MG/1
650 TABLET ORAL EVERY 4 HOURS PRN
Status: DISCONTINUED | OUTPATIENT
Start: 2024-10-06 | End: 2024-10-05 | Stop reason: HOSPADM

## 2024-10-03 RX ORDER — ONDANSETRON 2 MG/ML
4 INJECTION INTRAMUSCULAR; INTRAVENOUS EVERY 6 HOURS PRN
Status: DISCONTINUED | OUTPATIENT
Start: 2024-10-03 | End: 2024-10-05 | Stop reason: HOSPADM

## 2024-10-03 RX ORDER — HYDROMORPHONE HCL IN WATER/PF 6 MG/30 ML
0.4 PATIENT CONTROLLED ANALGESIA SYRINGE INTRAVENOUS
Status: DISCONTINUED | OUTPATIENT
Start: 2024-10-03 | End: 2024-10-05 | Stop reason: HOSPADM

## 2024-10-03 RX ORDER — METHOCARBAMOL 750 MG/1
750 TABLET, FILM COATED ORAL 4 TIMES DAILY PRN
Qty: 60 TABLET | Refills: 1 | Status: SHIPPED | OUTPATIENT
Start: 2024-10-03

## 2024-10-03 RX ORDER — NALOXONE HYDROCHLORIDE 0.4 MG/ML
0.1 INJECTION, SOLUTION INTRAMUSCULAR; INTRAVENOUS; SUBCUTANEOUS
Status: DISCONTINUED | OUTPATIENT
Start: 2024-10-03 | End: 2024-10-03 | Stop reason: HOSPADM

## 2024-10-03 RX ORDER — PANTOPRAZOLE SODIUM 40 MG/1
40 TABLET, DELAYED RELEASE ORAL DAILY PRN
Status: DISCONTINUED | OUTPATIENT
Start: 2024-10-03 | End: 2024-10-05 | Stop reason: HOSPADM

## 2024-10-03 RX ORDER — VALACYCLOVIR HYDROCHLORIDE 1 G/1
1000 TABLET, FILM COATED ORAL DAILY PRN
Status: DISCONTINUED | OUTPATIENT
Start: 2024-10-03 | End: 2024-10-05 | Stop reason: HOSPADM

## 2024-10-03 RX ORDER — KETOROLAC TROMETHAMINE 30 MG/ML
15 INJECTION, SOLUTION INTRAMUSCULAR; INTRAVENOUS ONCE
Status: COMPLETED | OUTPATIENT
Start: 2024-10-03 | End: 2024-10-03

## 2024-10-03 RX ORDER — LIDOCAINE HYDROCHLORIDE 10 MG/ML
INJECTION, SOLUTION INFILTRATION; PERINEURAL PRN
Status: DISCONTINUED | OUTPATIENT
Start: 2024-10-03 | End: 2024-10-03

## 2024-10-03 RX ORDER — HYDROXYZINE HYDROCHLORIDE 25 MG/1
25 TABLET, FILM COATED ORAL EVERY 4 HOURS PRN
Status: DISCONTINUED | OUTPATIENT
Start: 2024-10-03 | End: 2024-10-05 | Stop reason: HOSPADM

## 2024-10-03 RX ORDER — NALOXONE HYDROCHLORIDE 0.4 MG/ML
0.2 INJECTION, SOLUTION INTRAMUSCULAR; INTRAVENOUS; SUBCUTANEOUS
Status: DISCONTINUED | OUTPATIENT
Start: 2024-10-03 | End: 2024-10-05 | Stop reason: HOSPADM

## 2024-10-03 RX ORDER — ONDANSETRON 2 MG/ML
4 INJECTION INTRAMUSCULAR; INTRAVENOUS EVERY 30 MIN PRN
Status: DISCONTINUED | OUTPATIENT
Start: 2024-10-03 | End: 2024-10-03 | Stop reason: HOSPADM

## 2024-10-03 RX ORDER — FENTANYL CITRATE 50 UG/ML
50 INJECTION, SOLUTION INTRAMUSCULAR; INTRAVENOUS EVERY 5 MIN PRN
Status: DISCONTINUED | OUTPATIENT
Start: 2024-10-03 | End: 2024-10-03 | Stop reason: HOSPADM

## 2024-10-03 RX ORDER — FENTANYL CITRATE 50 UG/ML
25 INJECTION, SOLUTION INTRAMUSCULAR; INTRAVENOUS EVERY 5 MIN PRN
Status: DISCONTINUED | OUTPATIENT
Start: 2024-10-03 | End: 2024-10-03 | Stop reason: HOSPADM

## 2024-10-03 RX ORDER — HYDROMORPHONE HCL IN WATER/PF 6 MG/30 ML
0.2 PATIENT CONTROLLED ANALGESIA SYRINGE INTRAVENOUS
Status: DISCONTINUED | OUTPATIENT
Start: 2024-10-03 | End: 2024-10-05 | Stop reason: HOSPADM

## 2024-10-03 RX ORDER — SODIUM CHLORIDE 9 MG/ML
INJECTION, SOLUTION INTRAVENOUS CONTINUOUS
Status: DISCONTINUED | OUTPATIENT
Start: 2024-10-03 | End: 2024-10-05 | Stop reason: HOSPADM

## 2024-10-03 RX ORDER — HYDROMORPHONE HYDROCHLORIDE 2 MG/1
4 TABLET ORAL EVERY 4 HOURS PRN
Status: DISCONTINUED | OUTPATIENT
Start: 2024-10-03 | End: 2024-10-05 | Stop reason: HOSPADM

## 2024-10-03 RX ORDER — CEFAZOLIN SODIUM/WATER 2 G/20 ML
2 SYRINGE (ML) INTRAVENOUS
Status: COMPLETED | OUTPATIENT
Start: 2024-10-03 | End: 2024-10-03

## 2024-10-03 RX ORDER — AMOXICILLIN 250 MG
1 CAPSULE ORAL 2 TIMES DAILY
Status: DISCONTINUED | OUTPATIENT
Start: 2024-10-03 | End: 2024-10-05 | Stop reason: HOSPADM

## 2024-10-03 RX ORDER — DEXAMETHASONE SODIUM PHOSPHATE 10 MG/ML
INJECTION, SOLUTION INTRAMUSCULAR; INTRAVENOUS PRN
Status: DISCONTINUED | OUTPATIENT
Start: 2024-10-03 | End: 2024-10-03

## 2024-10-03 RX ORDER — ACETAMINOPHEN 325 MG/1
975 TABLET ORAL ONCE
Status: COMPLETED | OUTPATIENT
Start: 2024-10-03 | End: 2024-10-03

## 2024-10-03 RX ORDER — ACETAMINOPHEN 500 MG
500-1000 TABLET ORAL EVERY 6 HOURS PRN
COMMUNITY

## 2024-10-03 RX ORDER — ACETAMINOPHEN 325 MG/1
975 TABLET ORAL EVERY 8 HOURS
Status: DISCONTINUED | OUTPATIENT
Start: 2024-10-04 | End: 2024-10-05 | Stop reason: HOSPADM

## 2024-10-03 RX ORDER — ONDANSETRON 4 MG/1
4 TABLET, ORALLY DISINTEGRATING ORAL EVERY 30 MIN PRN
Status: DISCONTINUED | OUTPATIENT
Start: 2024-10-03 | End: 2024-10-03 | Stop reason: HOSPADM

## 2024-10-03 RX ORDER — PROPOFOL 10 MG/ML
INJECTION, EMULSION INTRAVENOUS PRN
Status: DISCONTINUED | OUTPATIENT
Start: 2024-10-03 | End: 2024-10-03

## 2024-10-03 RX ORDER — CEFAZOLIN SODIUM/WATER 2 G/20 ML
2 SYRINGE (ML) INTRAVENOUS SEE ADMIN INSTRUCTIONS
Status: DISCONTINUED | OUTPATIENT
Start: 2024-10-03 | End: 2024-10-03 | Stop reason: HOSPADM

## 2024-10-03 RX ORDER — HYDROMORPHONE HCL IN WATER/PF 6 MG/30 ML
0.4 PATIENT CONTROLLED ANALGESIA SYRINGE INTRAVENOUS EVERY 5 MIN PRN
Status: DISCONTINUED | OUTPATIENT
Start: 2024-10-03 | End: 2024-10-03 | Stop reason: HOSPADM

## 2024-10-03 RX ORDER — LIDOCAINE 40 MG/G
CREAM TOPICAL
Status: DISCONTINUED | OUTPATIENT
Start: 2024-10-03 | End: 2024-10-03 | Stop reason: HOSPADM

## 2024-10-03 RX ADMIN — FENTANYL CITRATE 50 MCG: 50 INJECTION INTRAMUSCULAR; INTRAVENOUS at 16:19

## 2024-10-03 RX ADMIN — FENTANYL CITRATE 100 MCG: 50 INJECTION INTRAMUSCULAR; INTRAVENOUS at 12:51

## 2024-10-03 RX ADMIN — HYDROMORPHONE HYDROCHLORIDE 0.4 MG: 0.2 INJECTION, SOLUTION INTRAMUSCULAR; INTRAVENOUS; SUBCUTANEOUS at 17:02

## 2024-10-03 RX ADMIN — KETOROLAC TROMETHAMINE 15 MG: 30 INJECTION, SOLUTION INTRAMUSCULAR at 17:10

## 2024-10-03 RX ADMIN — HYDROMORPHONE HYDROCHLORIDE 0.4 MG: 0.2 INJECTION, SOLUTION INTRAMUSCULAR; INTRAVENOUS; SUBCUTANEOUS at 17:21

## 2024-10-03 RX ADMIN — FENTANYL CITRATE 50 MCG: 50 INJECTION INTRAMUSCULAR; INTRAVENOUS at 16:48

## 2024-10-03 RX ADMIN — LIDOCAINE HYDROCHLORIDE 5 ML: 10 INJECTION, SOLUTION INFILTRATION; PERINEURAL at 12:51

## 2024-10-03 RX ADMIN — DEXAMETHASONE SODIUM PHOSPHATE 10 MG: 10 INJECTION, SOLUTION INTRAMUSCULAR; INTRAVENOUS at 12:51

## 2024-10-03 RX ADMIN — Medication 2 G: at 13:00

## 2024-10-03 RX ADMIN — HYDROMORPHONE HYDROCHLORIDE 0.5 MG: 1 INJECTION, SOLUTION INTRAMUSCULAR; INTRAVENOUS; SUBCUTANEOUS at 14:32

## 2024-10-03 RX ADMIN — SENNOSIDES AND DOCUSATE SODIUM 1 TABLET: 50; 8.6 TABLET ORAL at 19:37

## 2024-10-03 RX ADMIN — PROPOFOL 200 MG: 10 INJECTION, EMULSION INTRAVENOUS at 12:51

## 2024-10-03 RX ADMIN — KETAMINE HYDROCHLORIDE 50 MG: 10 INJECTION INTRAMUSCULAR; INTRAVENOUS at 13:29

## 2024-10-03 RX ADMIN — ROCURONIUM BROMIDE 20 MG: 10 INJECTION, SOLUTION INTRAVENOUS at 13:29

## 2024-10-03 RX ADMIN — TRANEXAMIC ACID 1 G: 1 INJECTION, SOLUTION INTRAVENOUS at 15:41

## 2024-10-03 RX ADMIN — HYDROMORPHONE HYDROCHLORIDE 0.2 MG: 0.2 INJECTION, SOLUTION INTRAMUSCULAR; INTRAVENOUS; SUBCUTANEOUS at 19:47

## 2024-10-03 RX ADMIN — ACETAMINOPHEN 975 MG: 325 TABLET ORAL at 17:38

## 2024-10-03 RX ADMIN — SODIUM CHLORIDE: 9 INJECTION, SOLUTION INTRAVENOUS at 19:37

## 2024-10-03 RX ADMIN — SODIUM CHLORIDE, POTASSIUM CHLORIDE, SODIUM LACTATE AND CALCIUM CHLORIDE: 600; 310; 30; 20 INJECTION, SOLUTION INTRAVENOUS at 09:43

## 2024-10-03 RX ADMIN — GABAPENTIN 300 MG: 300 CAPSULE ORAL at 09:47

## 2024-10-03 RX ADMIN — ROCURONIUM BROMIDE 50 MG: 10 INJECTION, SOLUTION INTRAVENOUS at 12:51

## 2024-10-03 RX ADMIN — HYDROMORPHONE HYDROCHLORIDE 0.4 MG: 0.2 INJECTION, SOLUTION INTRAMUSCULAR; INTRAVENOUS; SUBCUTANEOUS at 17:08

## 2024-10-03 RX ADMIN — HYDROMORPHONE HYDROCHLORIDE 2 MG: 2 TABLET ORAL at 22:19

## 2024-10-03 RX ADMIN — ROCURONIUM BROMIDE 20 MG: 10 INJECTION, SOLUTION INTRAVENOUS at 13:50

## 2024-10-03 RX ADMIN — TRANEXAMIC ACID 1 G: 1 INJECTION, SOLUTION INTRAVENOUS at 13:06

## 2024-10-03 RX ADMIN — PHENYLEPHRINE HYDROCHLORIDE 0.5 MCG/KG/MIN: 10 INJECTION INTRAVENOUS at 14:09

## 2024-10-03 RX ADMIN — PROPOFOL 50 MCG/KG/MIN: 10 INJECTION, EMULSION INTRAVENOUS at 12:57

## 2024-10-03 RX ADMIN — MIDAZOLAM 2 MG: 1 INJECTION INTRAMUSCULAR; INTRAVENOUS at 12:52

## 2024-10-03 RX ADMIN — HYDROMORPHONE HYDROCHLORIDE 0.2 MG: 0.2 INJECTION, SOLUTION INTRAMUSCULAR; INTRAVENOUS; SUBCUTANEOUS at 17:35

## 2024-10-03 RX ADMIN — CEFAZOLIN SODIUM 2 G: 2 INJECTION, SOLUTION INTRAVENOUS at 22:12

## 2024-10-03 RX ADMIN — HYDROMORPHONE HYDROCHLORIDE 0.5 MG: 1 INJECTION, SOLUTION INTRAMUSCULAR; INTRAVENOUS; SUBCUTANEOUS at 13:36

## 2024-10-03 RX ADMIN — FENTANYL CITRATE 50 MCG: 50 INJECTION INTRAMUSCULAR; INTRAVENOUS at 16:16

## 2024-10-03 RX ADMIN — SODIUM CHLORIDE, POTASSIUM CHLORIDE, SODIUM LACTATE AND CALCIUM CHLORIDE: 600; 310; 30; 20 INJECTION, SOLUTION INTRAVENOUS at 13:29

## 2024-10-03 RX ADMIN — FENTANYL CITRATE 50 MCG: 50 INJECTION INTRAMUSCULAR; INTRAVENOUS at 16:41

## 2024-10-03 RX ADMIN — SUGAMMADEX 200 MG: 100 INJECTION, SOLUTION INTRAVENOUS at 14:06

## 2024-10-03 RX ADMIN — ONDANSETRON 4 MG: 2 INJECTION INTRAMUSCULAR; INTRAVENOUS at 15:35

## 2024-10-03 RX ADMIN — DEXMEDETOMIDINE HYDROCHLORIDE 16 MCG: 100 INJECTION, SOLUTION INTRAVENOUS at 13:36

## 2024-10-03 ASSESSMENT — ACTIVITIES OF DAILY LIVING (ADL)
ADLS_ACUITY_SCORE: 23
ADLS_ACUITY_SCORE: 21
ADLS_ACUITY_SCORE: 22
ADLS_ACUITY_SCORE: 21
ADLS_ACUITY_SCORE: 23
ADLS_ACUITY_SCORE: 22
ADLS_ACUITY_SCORE: 23
ADLS_ACUITY_SCORE: 21
ADLS_ACUITY_SCORE: 23

## 2024-10-03 NOTE — ANESTHESIA POSTPROCEDURE EVALUATION
Patient: Mariela Doyle    Procedure: Procedure(s):  BILATERAL LUMBAR 4-5 TRANSFORAMINAL LUMBAR INTERBODY FUSION WITH BILATERAL LUMBAR 4-5 LAMINECTOMY AND DECOMPRESSION WITH STEALTH NAVIGATION AND ALLOGRAFT AND AUTOGRAFT       Anesthesia Type:  General    Note:  Disposition: Outpatient   Postop Pain Control: Uneventful            Sign Out: Well controlled pain   PONV: No   Neuro/Psych: Uneventful            Sign Out: Acceptable/Baseline neuro status   Airway/Respiratory: Uneventful            Sign Out: Acceptable/Baseline resp. status   CV/Hemodynamics: Uneventful            Sign Out: Acceptable CV status; No obvious hypovolemia; No obvious fluid overload   Other NRE:    DID A NON-ROUTINE EVENT OCCUR? No           Last vitals:  Vitals Value Taken Time   /61 10/03/24 1758   Temp 35.9  C (96.62  F) 10/03/24 1633   Pulse 79 10/03/24 1758   Resp 38 10/03/24 1758   SpO2 99 % 10/03/24 1758   Vitals shown include unfiled device data.    Electronically Signed By: Marlin Cosby MD  October 3, 2024  6:48 PM

## 2024-10-03 NOTE — PHARMACY-ADMISSION MEDICATION HISTORY
Pharmacist Admission Medication History    Admission medication history is complete. The information provided in this note is only as accurate as the sources available at the time of the update.    Information Source(s): Patient via in-person    Pertinent Information: n/a    Changes made to PTA medication list:  Added: acetaminophen  Deleted: None  Changed: None    Allergies reviewed with patient and updates made in EHR: yes    Medication History Completed By: Angelica Fontana [facility] 10/3/2024 9:29 AM    PTA Med List   Medication Sig Last Dose    acetaminophen (TYLENOL) 500 MG tablet Take 500-1,000 mg by mouth every 6 hours as needed for mild pain. Past Week    omeprazole (PRILOSEC) 20 MG DR capsule Take 20 mg by mouth daily as needed 10/2/2024 at pm    valACYclovir (VALTREX) 1000 mg tablet Take 1 tablet by mouth daily as needed. Prn cold sores More than a month

## 2024-10-03 NOTE — INTERVAL H&P NOTE
"I have reviewed the virtual H&P that is linked to this encounter. The physical exam performed by anesthesia during this surgical encounter serves as the physical portion of that the virtual H&P. There are no significant changes from that history and physical as noted.    Clinical Conditions Present on Arrival:  Clinically Significant Risk Factors Present on Admission                      # Severe Obesity: Estimated body mass index is 43.53 kg/m  as calculated from the following:    Height as of this encounter: 1.575 m (5' 2\").    Weight as of this encounter: 108 kg (238 lb).       "

## 2024-10-03 NOTE — ANESTHESIA CARE TRANSFER NOTE
Patient: Mariela Doyle    Procedure: Procedure(s):  BILATERAL LUMBAR 4-5 TRANSFORAMINAL LUMBAR INTERBODY FUSION WITH BILATERAL LUMBAR 4-5 LAMINECTOMY AND DECOMPRESSION WITH STEALTH NAVIGATION AND ALLOGRAFT AND AUTOGRAFT       Diagnosis: Low back pain [M54.50]  Spondylolisthesis of lumbar region [M43.16]  Spinal stenosis, lumbar region, without neurogenic claudication [M48.061]  Diagnosis Additional Information: No value filed.    Anesthesia Type:   General     Note:    Oropharynx: oropharynx clear of all foreign objects and spontaneously breathing  Level of Consciousness: awake  Oxygen Supplementation: face mask  Level of Supplemental Oxygen (L/min / FiO2): 10  Independent Airway: airway patency satisfactory and stable  Dentition: dentition unchanged  Vital Signs Stable: post-procedure vital signs reviewed and stable  Report to RN Given: handoff report given  Patient transferred to: PACU    Handoff Report: Identifed the Patient, Identified the Reponsible Provider, Reviewed the pertinent medical history, Discussed the surgical course, Reviewed Intra-OP anesthesia mangement and issues during anesthesia, Set expectations for post-procedure period and Allowed opportunity for questions and acknowledgement of understanding  Vitals:  Vitals Value Taken Time   /85 10/03/24 1622   Temp 35.8  C (96.44  F) 10/03/24 1627   Pulse 82 10/03/24 1627   Resp 25 10/03/24 1627   SpO2 100 % 10/03/24 1627   Vitals shown include unfiled device data.    Electronically Signed By: BRITTANI Roy CRNA  October 3, 2024  4:28 PM

## 2024-10-03 NOTE — ANESTHESIA PREPROCEDURE EVALUATION
Anesthesia Pre-Procedure Evaluation    Patient: Mariela Doyle   MRN: [id number] : 1970        Procedure : Procedure(s):  BILATERAL LUMBAR 4-5 TRANSFORAMINAL LUMBAR INTERBODY FUSION WITH BILATERAL LUMBAR 4-5 LAMINECTOMY AND DECOMPRESSION WITH STEALTH NAVIGATION AND ALLOGRAFT AND AUTOGRAFT          Past Medical History:   Diagnosis Date    Arthritis     Elevated blood pressure reading in office without diagnosis of hypertension 2019    Gastroesophageal reflux disease     Obese       Past Surgical History:   Procedure Laterality Date    ABDOMEN SURGERY      Pyloric stenosis    ARTHROPLASTY KNEE Right 2024    Procedure: RIGHT TOTAL KNEE ARTHROPLASTY;  Surgeon: Arron Guzman MD;  Location: [facility] OR    COLONOSCOPY      EYE SURGERY Bilateral     Lasik      No Known Allergies   Social History     Tobacco Use    Smoking status: Never     Passive exposure: Never    Smokeless tobacco: Never    Tobacco comments:     Quit [facility] before knee surgery    Substance Use Topics    Alcohol use: Yes     Comment: 4-10 drinks per week      Wt Readings from Last 1 Encounters:   24 108 kg (238 lb)        Anesthesia Evaluation   Pt has had prior anesthetic.     No history of anesthetic complications       ROS/MED HX  ENT/Pulmonary:  - neg pulmonary ROS     Neurologic:  - neg neurologic ROS     Cardiovascular:  - neg cardiovascular ROS     METS/Exercise Tolerance:     Hematologic:  - neg hematologic  ROS     Musculoskeletal:       GI/Hepatic:     (+) GERD,                   Renal/Genitourinary:  - neg Renal ROS     Endo:     (+)               Obesity (BMI 44),       Psychiatric/Substance Use:       Infectious Disease:       Malignancy:       Other:            Physical Exam    Airway        Mallampati: II   TM distance: > 3 FB   Neck ROM: full   Mouth opening: > 3 cm    Respiratory Devices and Support         Dental       (+) Minor Abnormalities - some fillings, tiny  "chips      Cardiovascular          Rhythm and rate: regular and normal     Pulmonary           breath sounds clear to auscultation           OUTSIDE LABS:  CBC:   Lab Results   Component Value Date    WBC 7.8 09/17/2024    WBC 9.7 05/02/2024    HGB 12.7 09/17/2024    HGB 10.1 (L) 05/23/2024    HCT 39.2 09/17/2024    HCT 38.5 05/02/2024     09/17/2024     05/02/2024     BMP:   Lab Results   Component Value Date     03/05/2024    POTASSIUM 4.6 03/05/2024    CHLORIDE 99 03/05/2024    CO2 26 03/05/2024    BUN 14.4 03/05/2024    CR 0.93 03/05/2024    GLC 76 10/03/2024     (H) 05/23/2024     COAGS: No results found for: \"PTT\", \"INR\", \"FIBR\"  POC: No results found for: \"BGM\", \"HCG\", \"HCGS\"  HEPATIC:   Lab Results   Component Value Date    ALBUMIN 4.5 03/05/2024    PROTTOTAL 8.7 (H) 03/05/2024    ALT 36 03/05/2024    AST 22 03/05/2024    ALKPHOS 91 03/05/2024    BILITOTAL 0.3 03/05/2024     OTHER:   Lab Results   Component Value Date    LACT 0.8 05/22/2024    A1C 5.7 (H) 03/28/2024    ASHIA 9.8 03/05/2024    LIPASE 42 03/05/2024    TSH 2.97 03/28/2024       Anesthesia Plan    ASA Status:  3    NPO Status:  NPO Appropriate    Anesthesia Type: General.     - Airway: ETT   Induction: Intravenous.           Consents    Anesthesia Plan(s) and associated risks, benefits, and realistic alternatives discussed. Questions answered and patient/representative(s) expressed understanding.     - Discussed: Risks, Benefits and Alternatives for the PROCEDURE were discussed     - Discussed with:  Patient            Postoperative Care    Pain management: IV analgesics, Oral pain medications.   PONV prophylaxis: Ondansetron (or other 5HT-3), Dexamethasone or Solumedrol     Comments:               Moises Figueroa MD    I have reviewed the pertinent notes and labs in the chart from the past 30 days and (re)examined the patient.  Any updates or changes from those notes are reflected in this note.                    " "  # Severe Obesity: Estimated body mass index is 43.53 kg/m  as calculated from the following:    Height as of this encounter: 1.575 m (5' 2\").    Weight as of this encounter: 108 kg (238 lb).             "

## 2024-10-03 NOTE — BRIEF OP NOTE
[facility]    Brief Operative Note    Pre-operative diagnosis:    L4-5  spinal stenosis  L4-5 spondylolisthesis  Spondylolisthesis of lumbar region [M43.16]  Spinal stenosis, lumbar region, without neurogenic claudication [M48.061]  Post-operative diagnosis Same as pre-operative diagnosis    Procedure: BILATERAL LUMBAR 4-5 TRANSFORAMINAL LUMBAR INTERBODY FUSION WITH BILATERAL LUMBAR 4-5 LAMINECTOMY AND DECOMPRESSION WITH STEALTH NAVIGATION AND ALLOGRAFT AND AUTOGRAFT, Bilateral - Spine    Surgeon: Surgeons and Role:     * Alvarado Hernandez MD - Primary     * Karmen Jerome PA-C - Assisting  Anesthesia: General   Estimated Blood Loss: 300 ml    Drains: Seven-Merlos  Specimens: * No specimens in log *  Findings:   None.  Complications: None.  Implants: * No implants in log *

## 2024-10-03 NOTE — OP NOTE
Orthopedic  Operative Note    Pre-operative diagnosis: 1.  L4-5 spondylolisthesis   2.  L4-5 spinal stenosis  Post-operative diagnosis: same    Procedure: 1.  L4-5 transforaminal lumbar interbody fusion  2.  L4-5 application of lumbar interbody device (Medtronic       Adaptix 24mm x11mm)  3.  L4-5 posterior spinal instrumentation (Medtronic Modulex)  4.  L4-5 posterior lateral fusion  5.  L4-5 laminectomy with bilateral partial and subtotal facetectomy  6.  Ephraim of local autograft  7.  Use of nonstructural allograft for spinal surgery (MagnetOS and Inject graft)  8.  Left posterior iliac crest bone marrow aspirate  9.  Use of operative microscopy  10.  Stealth navigation for pedicle screw placement    Surgeon: Alvarado Hernandez MD    Assistant(s): Karmen Jerome PA-C.  Please note this procedure technically required an assistant for the safety of the patient.  Ms. Jerome is an experienced PA and spinal surgery.  Her assistance was imperative and necessary to safely protect the surrounding soft tissue and neural structures as I performed the decompression and instrumentation phase of the procedure.    Anesthesia: General endotracheal anesthesia    Estimated blood loss: 300 ml     Drains: Seven-Merlos    Specimens: None    Findings: Spinal stenosis    Complications: None    Condition: Stable                Procedure Detail: Mariela is a pleasant 53-year-old female who presented to my office with a longstanding history of significant back and lower extremity pain.  Her lower extremity symptoms were more pronounced than her back.  An MRI demonstrated evidence of profound spinal stenosis at L4-5 with dynamic instability, and 7 mm of spondylolisthesis on flexion radiographs.  After an exhaustive course of conservative care, she elected to proceed with surgical intervention.  A detail description of the risk, benefits, and treatment alternatives inherent to the operation was explained in advance the patient  including but not limited to paralysis, failure to improve, permanent or transient nerve root injury, hemorrhage, infection, need for future surgery, adjacent segment disease, nonunion, or failure of instrumentation or hardware.  Spite these risks, she elected to proceed.  She was then seen by her primary care physician and scheduled for surgery.    The patient was met in the preoperative holding area by myself at which time the consent was reviewed and signed and the site was marked on the patient's back.  She was then seen by the anesthesia service and escorted back to the operating room.  Upon arrival in the operating room, the patient was intubated by the anesthesia service without complication.  A Fragoso catheter was inserted, and the patient was then flipped from the supine to the prone position on the Jermaine frame.  Her abdomen is found to be hanging free.  The patient's back was then prepped and draped in normal sterile fashion, and a timeout was called to ensure the proper procedure to be performed as well as the administration of prophylactic antibiotics.  Once this was completed, the procedure began.    C-arm fluoroscopic imaging was brought into the lateral plane, and under lateral fluoroscopic imaging we advanced a spinal needle through the skin to approximate the level of the L3 and L5 spinous process.  This was marked on the skin, and the needle was removed.  We then utilized a midline incision extending between L3 and the L5 and carried this down through the subcutaneous tissue and underlying fat.  The deep fascia was incised longitudinally in the direction of its fibers, and a subperiosteal dissection was carried out of the hemilamina of L3, L4, L5 bilaterally.  We carried our dissection out to the tips of the transverse processes of L4 and 5 bilaterally as well.  A marking radiograph  confirmed the correct operative level.         At this point, we advanced the Jamshidi needle into the left  posterior iliac crest and aspirated 2 cc of bone marrow aspirate which is then impregnated onto MagnetOS graft.  We then used a high-speed bur to decorticate the transverse processes of L4-5 bilaterally and packed the MagnetOS graft into the posterolateral gutters, thus completing the posterolateral fusion.  Later in the procedure, local bone graft was added in addition.    We then attached the Medtronic Stealth navigation registration arm to the L3 spinous process.  We draped around the registration arm, and the O-arm was brought in to procure imaging.  After ensuring satisfactory images, the drapes were removed.  At this point we began the instrumentation phase of the procedure.    A navigated high-speed bur was used to note the starting point of each pedicle screw as well as the correct trajectory.  We used a navigated tap to then fully tap each pedicle and were able to use Stealth navigation to measure diameter and appropriate depth of each screw.  Each screw was then placed at L4 and 5 on the left and right side with a navigated screwdriver until they were in the correct position, all screws were found to have a good secure fit.  Later in the procedure, each screw was tested with independent EMG neuromonitoring and found to have thresholds well within the safe zone.    He then began the decompressive phase of the procedure.  The operating microscope was draped and brought in.  Under operative microscopy, we remove the interspinous ligament between L4 and 5 and the majority of the spinous process of L4, and a portion of L5.  We then used a high-speed bur to thin out the central lamina at L4 and performed a central laminectomy progressing from caudad to cephalad with 2 and 3 mm Kerrison rongeurs.  Partial medial facetectomies were performed bilaterally and then ultimately a subtotal facetectomy on the left at L4-5 with a combination of 2 and 3 mm Kerrisons and straight and angled curettes.  Any redundant  ligamentum flavum was fully resected.  All bone removed during the decompression was harvested with a Tosha trap and used as local bone graft later in the procedure.  At this point, the thecal sac was well decompressed and regain its normal shape and pulsatile flow.  We were able to safely place a ball-tipped Silva probe out under each pedicle without obstruction on the left and right sides.    We then gently retracted the left L5 nerve root in a lateral to medial direction exposing the L4-5 interspace.  While protecting the L5 nerve root as well as the exiting L4 nerve root, we used a 15 blade to perform a box annulotomy at L4-5.  A complete discectomy was then performed with straight and angled curettes Kerrison and pituitary rongeurs.  We used a series of bullet distractors to regain normal anatomic height.  We then used straightening of curettes to remove all remaining cartilage from the endplate without violating the endplate itself.  A good bed of bleeding bone was induced.  At this point, we placed the trial implants and a Medtronic Adaptix interbody implant measuring 24mm x 11 mm was found to have the best secure fit.  The trial was removed, and the implant was opened.  The implant was then packed with local bone graft as well as the Inject graft.  While protecting the neural elements, and utilizing a transforaminal trajectory we then impacted the implant into place between the L4-5 vertebral bodies under live lateral fluoroscopic imaging until it was recessed the appropriate depth.  It too, was found to have a good secure fit.  The insertion jig was removed, and we backfilled the disc base with additional bone graft material.    At this point we secured rods between the L4-5 pedicle screw heads on the left and right sides.  This was locked into place with locking caps and a torque wrench, with compression applied before final tightening and reduction maneuver performed in an attempt to reduce the  spondylolisthesis.    We then copiously irrigated with normal saline, maintain hemostasis with bipolar electrocautery and thrombin paste, bone wax was placed to bleeding bone edges.  We then removed the retractor, and placed a deep Seven-Merlos drain exiting to the left of the incision.  We then closed in layers beginning with the deep fascia which was closed with a #1 Vicryl suture.  The subcutaneous tissue was closed with a 2-0 interrupted Vicryl suture.  And the skin was closed with a running 4-0 Monocryl suture.  The wound was then cleaned and dried in normal fashion.  Steri-Strips and gauze were then applied, and the drapes were removed.  The patient was then transferred from the prone to the supine position at which point she was extubated by the anesthesia service without complication.  All needle and sponge counts were correct at the end of the case.      Alvarado Hernandez

## 2024-10-03 NOTE — ANESTHESIA PROCEDURE NOTES
Airway       Patient location during procedure: OR       Procedure Start/Stop Times: 10/3/2024 1:05 PM  Staff -        CRNA: Luli Jesus APRN CRNA       Other Anesthesia Staff: Veronica Ibarra       Performed By: SRNA  Consent for Airway        Urgency: elective  Indications and Patient Condition       Indications for airway management: baylee-procedural       Induction type:intravenous       Mask difficulty assessment: 2 - vent by mask + OA or adjuvant +/- NMBA    Final Airway Details       Final airway type: endotracheal airway       Successful airway: ETT - single and Oral  Endotracheal Airway Details        ETT size (mm): 7.0       Successful intubation technique: direct laryngoscopy       DL Blade Type: Soliz 2       Grade View of Cords: 1       Adjucts: stylet       Position: Right       Measured from: gums/teeth       Secured at (cm): 21       Bite block used: Soft    Post intubation assessment        Placement verified by: capnometry, equal breath sounds and chest rise        Number of attempts at approach: 1       Number of other approaches attempted: 0       Secured with: commercial tube farnsworth and tape       Ease of procedure: easy       Dentition: Intact and Unchanged       Dental guard used and removed. Dental Guard Type: Standard White.    Medication(s) Administered   Medication Administration Time: 10/3/2024 1:05 PM

## 2024-10-04 ENCOUNTER — APPOINTMENT (OUTPATIENT)
Dept: PHYSICAL THERAPY | Facility: CLINIC | Age: 54
End: 2024-10-04
Attending: ORTHOPAEDIC SURGERY
Payer: COMMERCIAL

## 2024-10-04 ENCOUNTER — APPOINTMENT (OUTPATIENT)
Dept: OCCUPATIONAL THERAPY | Facility: CLINIC | Age: 54
End: 2024-10-04
Attending: ORTHOPAEDIC SURGERY
Payer: COMMERCIAL

## 2024-10-04 LAB — HGB BLD-MCNC: 10.9 G/DL (ref 11.7–15.7)

## 2024-10-04 PROCEDURE — 250N000013 HC RX MED GY IP 250 OP 250 PS 637: Performed by: ORTHOPAEDIC SURGERY

## 2024-10-04 PROCEDURE — 85018 HEMOGLOBIN: CPT | Performed by: ORTHOPAEDIC SURGERY

## 2024-10-04 PROCEDURE — 97161 PT EVAL LOW COMPLEX 20 MIN: CPT | Mod: GP

## 2024-10-04 PROCEDURE — 120N000001 HC R&B MED SURG/OB

## 2024-10-04 PROCEDURE — 250N000011 HC RX IP 250 OP 636: Performed by: ORTHOPAEDIC SURGERY

## 2024-10-04 PROCEDURE — 97535 SELF CARE MNGMENT TRAINING: CPT | Mod: GO

## 2024-10-04 PROCEDURE — 97530 THERAPEUTIC ACTIVITIES: CPT | Mod: GP

## 2024-10-04 PROCEDURE — 97166 OT EVAL MOD COMPLEX 45 MIN: CPT | Mod: GO

## 2024-10-04 PROCEDURE — 36415 COLL VENOUS BLD VENIPUNCTURE: CPT | Performed by: ORTHOPAEDIC SURGERY

## 2024-10-04 RX ADMIN — HYDROMORPHONE HYDROCHLORIDE 2 MG: 2 TABLET ORAL at 06:56

## 2024-10-04 RX ADMIN — HYDROMORPHONE HYDROCHLORIDE 2 MG: 2 TABLET ORAL at 13:19

## 2024-10-04 RX ADMIN — ACETAMINOPHEN 975 MG: 325 TABLET ORAL at 01:15

## 2024-10-04 RX ADMIN — THERA TABS 1 TABLET: TAB at 08:39

## 2024-10-04 RX ADMIN — ONDANSETRON 4 MG: 2 INJECTION INTRAMUSCULAR; INTRAVENOUS at 08:28

## 2024-10-04 RX ADMIN — SENNOSIDES AND DOCUSATE SODIUM 1 TABLET: 50; 8.6 TABLET ORAL at 08:39

## 2024-10-04 RX ADMIN — METHOCARBAMOL TABLETS 750 MG: 750 TABLET, COATED ORAL at 08:39

## 2024-10-04 RX ADMIN — CEFAZOLIN SODIUM 2 G: 2 INJECTION, SOLUTION INTRAVENOUS at 06:08

## 2024-10-04 RX ADMIN — SENNOSIDES AND DOCUSATE SODIUM 1 TABLET: 50; 8.6 TABLET ORAL at 21:38

## 2024-10-04 RX ADMIN — PROCHLORPERAZINE MALEATE 10 MG: 10 TABLET ORAL at 13:19

## 2024-10-04 RX ADMIN — HYDROXYZINE HYDROCHLORIDE 25 MG: 25 TABLET ORAL at 01:18

## 2024-10-04 RX ADMIN — HYDROMORPHONE HYDROCHLORIDE 2 MG: 2 TABLET ORAL at 02:25

## 2024-10-04 RX ADMIN — HYDROMORPHONE HYDROCHLORIDE 4 MG: 2 TABLET ORAL at 21:38

## 2024-10-04 RX ADMIN — ACETAMINOPHEN 975 MG: 325 TABLET ORAL at 17:59

## 2024-10-04 RX ADMIN — ACETAMINOPHEN 975 MG: 325 TABLET ORAL at 09:29

## 2024-10-04 RX ADMIN — POLYETHYLENE GLYCOL 3350 17 G: 17 POWDER, FOR SOLUTION ORAL at 08:39

## 2024-10-04 ASSESSMENT — ACTIVITIES OF DAILY LIVING (ADL)
ADLS_ACUITY_SCORE: 24
ADLS_ACUITY_SCORE: 22
ADLS_ACUITY_SCORE: 24
ADLS_ACUITY_SCORE: 24
ADLS_ACUITY_SCORE: 22
ADLS_ACUITY_SCORE: 24
ADLS_ACUITY_SCORE: 22
ADLS_ACUITY_SCORE: 23
ADLS_ACUITY_SCORE: 22
ADLS_ACUITY_SCORE: 24
ADLS_ACUITY_SCORE: 23
ADLS_ACUITY_SCORE: 23
ADLS_ACUITY_SCORE: 24
ADLS_ACUITY_SCORE: 22
IADL_COMMENTS: FAMILY WILL DO UNTIL PT IS RECOVERED
ADLS_ACUITY_SCORE: 24

## 2024-10-04 NOTE — PLAN OF CARE
Problem: Adult Inpatient Plan of Care  Goal: Optimal Comfort and Wellbeing  Outcome: Progressing     Problem: Spinal Surgery  Goal: Optimal Pain Control and Function  Outcome: Progressing     Patient vital signs are at baseline: Yes  Patient able to ambulate as they were prior to admission or with assist devices provided by therapies during their stay:  Yes  Patient MUST void prior to discharge:  Yes, passed all PVRs  Patient able to tolerate oral intake:  Yes  Pain has adequate pain control using Oral analgesics:  Yes  Does patient have an identified :  Yes  Has goal D/C date and time been discussed with patient:  Yes    Patient A&O, VSS, and CMS intact. Pain is managed. MANDIE drain in place and draining. Fragoso taken out this AM, passed all PVRs. Dressing CDI, MANDIE dressing with dried drainage. Up with a of 1, walker. Ambulating and tolerating oral intake. Call light appropriate and all alarms in place. Pending drain output.  Lachelle Gomez RN

## 2024-10-04 NOTE — PROGRESS NOTES
10/04/24 1000   Appointment Info   Signing Clinician's Name / Credentials (OT) Stacy Mcclain, OTR/L   Living Environment   People in Home alone  (mom will stay with her as long as needed)   Current Living Arrangements apartment   Living Environment Comments has tub shower with chair, standard toilet with vanity   Self-Care   Usual Activity Tolerance good   Current Activity Tolerance fair   Activity/Exercise/Self-Care Comment Pt was independent with ADLS and IADLS prior to admit   Instrumental Activities of Daily Living (IADL)   IADL Comments Family will do until pt is recovered   General Information   Onset of Illness/Injury or Date of Surgery 10/03/24   Referring Physician Alvarado Hernandez   Patient/Family Therapy Goal Statement (OT) heal well   Additional Occupational Profile Info/Pertinent History of Current Problem pt here for elective L4-5 fusion   Existing Precautions/Restrictions fall;no pivoting or twisting;lifting;spinal   General Observations and Info brace when OOB   Cognitive Status Examination   Affect/Mental Status (Cognitive) WNL   Range of Motion Comprehensive   General Range of Motion no range of motion deficits identified   Strength Comprehensive (MMT)   General Manual Muscle Testing (MMT) Assessment no strength deficits identified   Bed Mobility   Comment (Bed Mobility) min   Transfers   Transfer Comments min   Activities of Daily Living   BADL Assessment/Intervention lower body dressing;toileting;upper body dressing   Upper Body Dressing Assessment/Training   [place] Level (Upper Body Dressing) minimum assist (75% patient effort)   Lower Body Dressing Assessment/Training   [place] Level (Lower Body Dressing) minimum assist (75% patient effort)   Toileting   [place] Level (Toileting) minimum assist (75% patient effort)   Clinical Impression   Criteria for Skilled Therapeutic Interventions Met (OT) Yes, treatment indicated   OT Diagnosis Decreased independence with adls   OT Problem  List-Impairments impacting ADL post-surgical precautions;pain   Assessment of Occupational Performance 3-5 Performance Deficits   Identified Performance Deficits dressing, transfers, bed mobility, toileting   Planned Therapy Interventions (OT) ADL retraining;bed mobility training;transfer training;home program guidelines   Clinical Decision Making Complexity (OT) detailed assessment/moderate complexity   Risk & Benefits of therapy have been explained evaluation/treatment results reviewed;care plan/treatment goals reviewed;risks/benefits reviewed;current/potential barriers reviewed;participants voiced agreement with care plan;participants included;patient   OT Total Evaluation Time   OT Eval, Moderate Complexity Minutes (83933) 15   OT Goals   Therapy Frequency (OT) One time eval and treatment   OT Goals Upper Body Dressing;Lower Body Dressing;Bed Mobility;Toilet Transfer/Toileting   OT: Upper Body Dressing Modified independent;including orthotic;within precautions;Goal Met;Completed   OT: Lower Body Dressing Modified independent;using adaptive equipment;within precautions;Goal Met;Completed   OT: Bed Mobility Modified independent;supine to/from sitting;rolling;bridging;within precautions;Goal Met;Completed   OT: Toilet Transfer/Toileting Modified independent;toilet transfer;cleaning and garment management;within precautions;Goal Met;Completed   Interventions   Interventions Quick Adds Self-Care/Home Management   Self-Care/Home Management   Self-Care/Home Mgmt/ADL, Compensatory, Meal Prep Minutes (62784) 30   Symptoms Noted During/After Treatment (Meal Preparation/Planning Training) fatigue;increased pain   Treatment Detail/Skilled Intervention Taught pt how to perform adls following spine precautions: total body dressing, including lumbar corset, transfers to and from bed, chair, toilet, car, and bed mobility using the log roll.  Pt mod I with all after OT intervention.  Taught pt how to use a reacher and sock aid  for LE dressing. PT purchased both from [employer]. Gave handouts and answered all questions. Mom present for session.   OT Discharge Planning   OT Plan dc OT   OT Discharge Recommendation (DC Rec) (S)  home with assist   OT Rationale for DC Rec Pt has good support at home   OT Brief overview of current status Pt is mod I with basic adls and functional mobility after OT intervention while following correct body mechanics   OT Equipment Needed at Discharge reacher;raised toilet seat;other (see comments)  (sock aid)   Total Session Time   Timed Code Treatment Minutes 30   Total Session Time (sum of timed and untimed services) 45

## 2024-10-04 NOTE — PLAN OF CARE
Problem: Adult Inpatient Plan of Care  Goal: Absence of Hospital-Acquired Illness or Injury  Intervention: Prevent and Manage VTE (Venous Thromboembolism) Risk  Problem: Spinal Surgery  Goal: Optimal Coping with Surgery  Intervention: Support Psychosocial Response to Surgery   Problem: Adult Inpatient Plan of Care  Goal: Optimal Comfort and Wellbeing  Intervention: Monitor Pain and Promote Comfort   Goal Outcome Evaluation:  Patient vital signs are at baseline: Yes. Oxygen at 1L via NC at night.   Patient able to ambulate as they were prior to admission or with assist devices provided by therapies during their stay:  Yes. Patient ambulated in room and up in chair with assist of one.  Patient MUST void prior to discharge:  No,  Reason:  Has Fragoso catheter in place.   Patient able to tolerate oral intake:  Yes  Pain has adequate pain control using Oral analgesics:  Yes. Pain controlled with scheduled Tylenol, PRN Dilaudid and PRN Atarax. Utilized ice packs and repositioned for comfort.   Does patient have an identified :  Yes  Has goal D/C date and time been discussed with patient:  Yes

## 2024-10-04 NOTE — PLAN OF CARE
Goal Outcome Evaluation:    Patient vital signs are at baseline: Yes  Patient able to ambulate as they were prior to admission or with assist devices provided by therapies during their stay:  No,  Reason:  not oob yet  Patient MUST void prior to discharge:  Yes cornejo in place  Patient able to tolerate oral intake:  Yes   Pain has adequate pain control using Oral analgesics:  No,  Reason:  IV dilaudid given for breakthrough pain  Does patient have an identified :  Yes  Has goal D/C date and time been discussed with patient:  Yes

## 2024-10-04 NOTE — PLAN OF CARE
Patient vital signs are at baseline: Yes  Patient able to ambulate as they were prior to admission or with assist devices provided by therapies during their stay:  Yes  Patient MUST void prior to discharge:  yes, need 2 more PVRS   Patient able to tolerate oral intake:  Yes  Pain has adequate pain control using Oral analgesics:  Yes  Does patient have an identified :  Yes  Has goal D/C date and time been discussed with patient:  Yes   Problem: Adult Inpatient Plan of Care  Goal: Absence of Hospital-Acquired Illness or Injury  Intervention: Identify and Manage Fall Risk  Recent Flowsheet Documentation  Taken 10/4/2024 1100 by Pop Ochoa RN  Safety Promotion/Fall Prevention: safety round/check completed  Taken 10/4/2024 0910 by Pop Ochoa RN  Safety Promotion/Fall Prevention: safety round/check completed  Taken 10/4/2024 0845 by Pop Ochoa RN  Safety Promotion/Fall Prevention:   assistive device/personal items within reach   room door open   room near nurse's station   room organization consistent   safety round/check completed   lighting adjusted  Taken 10/4/2024 0700 by Pop Ochoa RN  Safety Promotion/Fall Prevention: safety round/check completed   Patioent A&O,VSS,RA. Fragoso taken out during am shift. Pain managed. Did PT and OT this morning. MANDIE drain in place. Dressing CDI. Voiding adequately but needs 2 more PVRs. Cms intact. All alarms on.  Call light with in reach. Pop Ochoa RN

## 2024-10-04 NOTE — PROGRESS NOTES
"[facility] Progress Note      Post-operative Day: 1 Day Post-Op    Procedure(s):  BILATERAL LUMBAR 4-5 TRANSFORAMINAL LUMBAR INTERBODY FUSION WITH BILATERAL LUMBAR 4-5 LAMINECTOMY AND DECOMPRESSION WITH STEALTH NAVIGATION AND ALLOGRAFT AND AUTOGRAFT      Subjective: Patient seen resting in bed, endorses pain to lumbar spine worse with left leg raise. Has been tolerable on PO analgesics. Does endorse a headache this AM, unable to discern if positional, no photophobia or gross neuro changes. No new numbness, tingling or weakness. Fragoso catheter in place draining clear colored urine. Is passing gas.       Pain: moderate  Chest pain, SOB:  No    Drain: 90 ml out overnight     Objective:  Blood pressure 115/71, pulse 78, temperature 98  F (36.7  C), temperature source Oral, resp. rate 18, height 1.575 m (5' 2\"), weight 108 kg (238 lb), SpO2 100%, not currently breastfeeding.    Patient Vitals for the past 24 hrs:   BP Temp Temp src Pulse Resp SpO2   10/04/24 0654 115/71 -- -- 78 -- --   10/03/24 2350 (!) 146/82 98  F (36.7  C) Oral 74 18 100 %   10/03/24 2000 107/78 98.5  F (36.9  C) Oral 74 20 100 %   10/03/24 1930 105/77 97.8  F (36.6  C) Oral 69 18 99 %   10/03/24 1855 111/76 98.2  F (36.8  C) Oral 76 20 99 %   10/03/24 1820 123/81 97.5  F (36.4  C) Oral 75 19 99 %   10/03/24 1800 120/61 -- -- 77 19 99 %   10/03/24 1758 120/61 -- -- 79 (!) 38 99 %   10/03/24 1750 116/58 -- -- 75 11 100 %   10/03/24 1740 128/63 -- -- 80 28 100 %   10/03/24 1735 128/63 -- -- 75 19 100 %   10/03/24 1730 113/58 -- -- 75 12 98 %   10/03/24 1721 112/61 -- -- 80 15 95 %   10/03/24 1720 112/61 -- -- 76 16 (!) 86 %   10/03/24 1710 115/62 -- -- 80 18 94 %   10/03/24 1700 130/60 -- -- 86 12 93 %   10/03/24 1650 131/63 -- -- 83 19 95 %   10/03/24 1640 -- -- -- 86 27 97 %   10/03/24 1635 -- -- -- 85 28 96 %   10/03/24 1630 120/65 -- -- 82 10 100 %   10/03/24 1622 139/85 97.9  F (36.6  C) Core 85 16 100 %       Wt Readings from Last 4 " Encounters:   09/30/24 108 kg (238 lb)   09/17/24 108.4 kg (239 lb)   07/05/24 108 kg (238 lb 1.6 oz)   05/22/24 108.9 kg (240 lb)       General: Alert and orientated, NAD  Respiratory: Non-labored breathing on RA  BLE motor function, sensation, and circulation intact   Yes, Dorsiflexion/plantarflexion intact and equal bilaterally. Moves all other extremities with ease and purpose   Wound status: incisions are clean dry and intact. Yes MANDIE drain in place with SA output   Calf tenderness: Bilateral  No    Pertinent Labs   Lab Results: personally reviewed.     Recent Labs   Lab Test 10/04/24  0544 10/03/24  1644 09/17/24  1439 05/23/24  0538 05/02/24  1623 03/05/24  1806   WBC  --   --  7.8  --  9.7 9.6   HGB 10.9* 11.9 12.7   < > 12.5 13.4   HCT  --   --  39.2  --  38.5 40.6   MCV  --   --  91  --  93 92   PLT  --   --  290  --  296 321   NA  --   --   --   --   --  138    < > = values in this interval not displayed.       Plan:   Continue to monitor drain output, discontinue when less then 30 ml a shift.   Anticoagulation protocol: Mechanical and/or ambulation    Pain medications:  scopainmedication: dilaudid, tylenol, and vistaril, Robaxin and ice  Weight bearing status:  No heavy lifting, bending, or twisting. LSO brace on when out of bed  Disposition:  Home pending adequate pain control, drain output, cleared by therapies and hospitalist   Continue cares and rehabilitation     Report completed by:  BRITTANI Recinos CNP  Date: 10/4/2024  Time: 9:09 AM

## 2024-10-04 NOTE — PROGRESS NOTES
10/04/24 1050   Appointment Info   Signing Clinician's Name / Credentials (PT) Padmini Camara DPT   Rehab Comments (PT) LSO OOB, spine precautions, no bending/lifting >5 lb/twisting   Living Environment   People in Home alone   Current Living Arrangements apartment   Home Accessibility stairs to enter home   Number of Stairs, Main Entrance greater than 10 stairs  (16)   Stair Railings, Main Entrance railings on both sides of stairs  (can reach one at a time)   Living Environment Comments mother will be present 24/7 for assist as needed   Self-Care   Usual Activity Tolerance good   Current Activity Tolerance moderate   Equipment Currently Used at Home walker, rolling  (was not using equipment)   Fall history within last six months no   General Information   Onset of Illness/Injury or Date of Surgery 10/03/24   Referring Physician Alvarado Hernandez MD   Patient/Family Therapy Goals Statement (PT) Return home   Pertinent History of Current Problem (include personal factors and/or comorbidities that impact the POC) S/p B L4-5 TLIF, laminectomy and decompression 10/3/24. PMH: GERD, B TKA, severe obesity   Existing Precautions/Restrictions spinal   Cognition   Affect/Mental Status (Cognition) WNL   Follows Commands (Cognition) WNL   Pain Assessment   Patient Currently in Pain   (low pain. Reported new L hip/thigh discomfort felt when picking up leg in standing; new since surgery. RN notified. Headache/neck pain, no change with upright activity)   Integumentary/Edema   Integumentary/Edema Comments incision not assessed. Drain kept in pocket for session   Range of Motion (ROM)   ROM Comment WFL   Strength (Manual Muscle Testing)   Strength Comments WFL   Bed Mobility   Bed Mobility   (not assessed)   Transfers   Transfers sit-stand transfer   Maintains Weight-bearing Status (Transfers) able to maintain   Sit-Stand Transfer   Sit-Stand [place] (Transfers) supervision   Assistive Device (Sit-Stand Transfers)  walker, front-wheeled   Gait/Stairs (Locomotion)   [place] Level (Gait) supervision   Assistive Device (Gait) walker, front-wheeled   Distance in Feet (Gait) 15   Sensory Examination   Sensory Perception Comments reported no new numbness/tingling post op   Clinical Impression   Criteria for Skilled Therapeutic Intervention Yes, treatment indicated   PT Diagnosis (PT) Impaired functional mobility   Influenced by the following impairments pain   Functional limitations due to impairments impaired gait, transfers   Clinical Presentation (PT Evaluation Complexity) stable   Clinical Presentation Rationale Presented as medically diagnosed   Clinical Decision Making (Complexity) low complexity   Planned Therapy Interventions (PT) gait training;home exercise program;patient/family education;ROM (range of motion);strengthening;stair training;transfer training   Risk & Benefits of therapy have been explained patient;mother   Clinical Impression Comments Pt was up in chair prior to session with LSO on. Pt was able to recall spine precautions and when to have her LSO on. Mother present for all of session. Vitals taken; Bp 128/83 R UE, HR 85, oxygen 95%. Pt stood, walked to IP gym, sat for rest. Stood and performed stairs, walked back to room. Transferred to recliner. Reclined; performed AP, QS, GS for HEP. Pt sat up for comfort. She was left with chair locked, alarm on, call light given, belongings in reach, no distress. Emesis bags and peppermint essestial oil on cotton given for nausea. Message hand off to RN post session.   PT Total Evaluation Time   PT Eval, Low Complexity Minutes (85468) 10   Physical Therapy Goals   PT Frequency One time eval and treatment only   PT Predicted Duration/Target Date for Goal Attainment 10/04/24   PT Goals Transfers;Gait;Stairs   PT: Transfers Modified independent;Assistive device;Goal Met   PT: Gait Modified independent;100 feet;Rolling walker;Goal Met   PT: Stairs 8  stairs;Supervision/stand-by assist;Goal Met  (Pt deferrred full 16 stairs. Has landing)   Interventions   Interventions Quick Adds Therapeutic Activity;Gait Training;Therapeutic Procedure   Therapeutic Procedure/Exercise   Treatment Detail/Skilled Intervention Reclined in chair: AP, GS, QS; x 10 reps. Handout provided. Education provided for standing exercises   Therapeutic Activity   Therapeutic Activities: dynamic activities to improve functional performance Minutes (50719) 12   Treatment Detail/Skilled Intervention Skilled v/c for sit<>stand transfers. Able to maintain precautions   Gait Training   Treatment Detail/Skilled Intervention Amb 140 ft x 2, 2WW, mod I, step through gait pattern. Tolerated well. Performed 8 steps, uni rail. Started with hand hold assist an progressed to SBA.   PT Discharge Planning   PT Plan D/c PT   PT Discharge Recommendation (DC Rec) other (see comments)  (Defer to ortho)   PT Rationale for DC Rec Pt mod I with gait, SBA on stairs and has 24/7 assist   PT Brief overview of current status Mod I 140 ft 2WW   PT Equipment Needed at Discharge   (owns)   Total Session Time   Timed Code Treatment Minutes 12   Total Session Time (sum of timed and untimed services) 22     Physical Therapy Discharge Summary    Reason for therapy discharge:    All goals and outcomes met, no further needs identified.    Progress towards therapy goal(s). See goals on Care Plan in [facility] electronic health record for goal details.  Goals met    Therapy recommendation(s):    Continue home exercise program.

## 2024-10-05 VITALS
HEART RATE: 81 BPM | RESPIRATION RATE: 16 BRPM | HEIGHT: 62 IN | OXYGEN SATURATION: 95 % | WEIGHT: 238 LBS | BODY MASS INDEX: 43.79 KG/M2 | SYSTOLIC BLOOD PRESSURE: 136 MMHG | DIASTOLIC BLOOD PRESSURE: 76 MMHG | TEMPERATURE: 97.4 F

## 2024-10-05 LAB
HGB BLD-MCNC: 10.2 G/DL (ref 11.7–15.7)
HOLD SPECIMEN: NORMAL

## 2024-10-05 PROCEDURE — 36415 COLL VENOUS BLD VENIPUNCTURE: CPT | Performed by: ORTHOPAEDIC SURGERY

## 2024-10-05 PROCEDURE — 250N000013 HC RX MED GY IP 250 OP 250 PS 637: Performed by: ORTHOPAEDIC SURGERY

## 2024-10-05 PROCEDURE — 85018 HEMOGLOBIN: CPT | Performed by: ORTHOPAEDIC SURGERY

## 2024-10-05 RX ORDER — HYDROMORPHONE HYDROCHLORIDE 2 MG/1
2-4 TABLET ORAL EVERY 4 HOURS PRN
Qty: 30 TABLET | Refills: 0 | Status: SHIPPED | OUTPATIENT
Start: 2024-10-05

## 2024-10-05 RX ADMIN — SENNOSIDES AND DOCUSATE SODIUM 1 TABLET: 50; 8.6 TABLET ORAL at 07:48

## 2024-10-05 RX ADMIN — HYDROMORPHONE HYDROCHLORIDE 4 MG: 2 TABLET ORAL at 02:09

## 2024-10-05 RX ADMIN — POLYETHYLENE GLYCOL 3350 17 G: 17 POWDER, FOR SOLUTION ORAL at 07:48

## 2024-10-05 RX ADMIN — ACETAMINOPHEN 975 MG: 325 TABLET ORAL at 10:15

## 2024-10-05 RX ADMIN — MAGNESIUM HYDROXIDE 30 ML: 2400 SUSPENSION ORAL at 07:48

## 2024-10-05 RX ADMIN — PANTOPRAZOLE SODIUM 40 MG: 40 TABLET, DELAYED RELEASE ORAL at 06:44

## 2024-10-05 RX ADMIN — ACETAMINOPHEN 975 MG: 325 TABLET ORAL at 02:09

## 2024-10-05 RX ADMIN — HYDROMORPHONE HYDROCHLORIDE 4 MG: 2 TABLET ORAL at 08:09

## 2024-10-05 RX ADMIN — THERA TABS 1 TABLET: TAB at 07:48

## 2024-10-05 RX ADMIN — METHOCARBAMOL TABLETS 750 MG: 750 TABLET, COATED ORAL at 06:45

## 2024-10-05 ASSESSMENT — ACTIVITIES OF DAILY LIVING (ADL)
ADLS_ACUITY_SCORE: 22

## 2024-10-05 NOTE — DISCHARGE SUMMARY
[facility] Discharge Summary                                  [facility]     OTONIEL PELAEZ [id number]   Age: 53 year old  PCP: Mara Arevalo, [phone] 1970     Date of Admission:  10/3/2024  Date of Discharge::  10/5/2024  Discharge Provider:  Mara Parra PA-C    Code status:  Full Code    Admission Information:  Admission Diagnosis:  Low back pain [M54.50]  Spondylolisthesis of lumbar region [M43.16]  Spinal stenosis, lumbar region, without neurogenic claudication [M48.061]    Post-Operative Day: 2 Days Post-Op     Reason for admission:  The patient was admitted for the following:Procedure(s) (LRB):  BILATERAL LUMBAR 4-5 TRANSFORAMINAL LUMBAR INTERBODY FUSION WITH BILATERAL LUMBAR 4-5 LAMINECTOMY AND DECOMPRESSION WITH STEALTH NAVIGATION AND ALLOGRAFT AND AUTOGRAFT (Bilateral)    Active Problems:    Spinal stenosis at L4-L5 level    Spondylolisthesis at L4-L5 level      Allergies:  Patient has no known allergies.    Following the procedure noted above the patient was transferred to the post-op floor and started on:    Therapy:  physical therapy and occupational therapy  Anticoagulation Plan: Mechanical and/or ambulation    Pain Management: scopainmedication: dilaudid, tylenol, and vistaril, robaxin, ice  Weight bearing status: Weight bearing as tolerated     The patient was followed by Orthopedics during the inpatient treatment course:  Complications:  None  Additional consultations:  None     Pertinent Labs   Lab Results: personally reviewed.     Recent Labs   Lab Test 10/05/24  0617 10/04/24  0544 10/03/24  1644 09/17/24  1439 05/23/24  0538 05/02/24  1623 03/05/24  1806   WBC  --   --   --  7.8  --  9.7 9.6   HGB 10.2* 10.9* 11.9 12.7   < > 12.5 13.4   HCT  --   --   --  39.2  --  38.5 40.6   MCV  --   --   --  91  --  93 92   PLT  --   --   --  290  --  296 321   NA  --   --   --   --   --   --  138    < > = values in this interval not displayed.          Discharge  Information:  Condition at discharge: Stable  Discharge destination:  Discharged to home     Medications at discharge:  Current Discharge Medication List        START taking these medications    Details   methocarbamol (ROBAXIN) 750 MG tablet Take 1 tablet (750 mg) by mouth 4 times daily as needed for muscle spasms.  Qty: 60 tablet, Refills: 1    Associated Diagnoses: Spondylolisthesis at L4-L5 level      senna-docusate (SENOKOT-S/PERICOLACE) 8.6-50 MG tablet Take 1-2 tablets by mouth 2 times daily. Take while on oral narcotics to prevent or treat constipation.  Qty: 60 tablet, Refills: 0    Comments: While taking narcotics  Associated Diagnoses: Spondylolisthesis at L4-L5 level           CONTINUE these medications which have NOT CHANGED    Details   acetaminophen (TYLENOL) 500 MG tablet Take 500-1,000 mg by mouth every 6 hours as needed for mild pain.      omeprazole (PRILOSEC) 20 MG DR capsule Take 20 mg by mouth daily as needed      valACYclovir (VALTREX) 1000 mg tablet Take 1 tablet by mouth daily as needed. Prn cold sores                        Follow-Up Care:  Patient should be seen in the office in 14 days by the Orthopedic Surgeon/Physician Assistant.  Call [phone] for appointment or questions.    It was my pleasure to take care of the above patient.  Mara Parra PA-C

## 2024-10-05 NOTE — PROVIDER NOTIFICATION
Notified Dr. Luna about bleeding through the dressing, would it be okay to hold ASA ?    Order received to hold ASA for tonight

## 2024-10-05 NOTE — PLAN OF CARE
Problem: Adult Inpatient Plan of Care  Goal: Absence of Hospital-Acquired Illness or Injury  Intervention: Identify and Manage Fall Risk  Recent Flowsheet Documentation  Taken 10/5/2024 0920 by Lachelle Gomez RN  Safety Promotion/Fall Prevention: safety round/check completed  Taken 10/5/2024 0740 by Lachelle Gomez RN  Safety Promotion/Fall Prevention:   activity supervised   assistive device/personal items within reach   clutter free environment maintained   lighting adjusted   nonskid shoes/slippers when out of bed   room near nurse's station   safety round/check completed  Taken 10/5/2024 0700 by Lachelle Gomez RN  Safety Promotion/Fall Prevention: safety round/check completed     Patient vital signs are at baseline: Yes  Patient able to ambulate as they were prior to admission or with assist devices provided by therapies during their stay:  Yes  Patient MUST void prior to discharge:  Yes  Patient able to tolerate oral intake:  Yes  Pain has adequate pain control using Oral analgesics:  Yes  Does patient have an identified :  Yes  Has goal D/C date and time been discussed with patient:  Yes    Patient ready for discharge. Completed discharge paperwork with patient and family. Patient and family stated understanding and questions were answered. All belongings were sent with the patient. Discharged safely.  Lachelle Gomez RN

## 2024-10-05 NOTE — PLAN OF CARE
Problem: Adult Inpatient Plan of Care  Goal: Optimal Comfort and Wellbeing  Outcome: Progressing  Intervention: Monitor Pain and Promote Comfort   Goal Outcome Evaluation:       Patient vital signs are at baseline: Yes  Patient able to ambulate as they were prior to admission or with assist devices provided by therapies during their stay:  Yes  Patient MUST void prior to discharge:  Yes  Patient able to tolerate oral intake:  Yes  Pain has adequate pain control using Oral analgesics:  Yes  Does patient have an identified :  Yes  Has goal D/C date and time been discussed with patient:  Yes    Patient is alert and oriented X 4, VSS on RA. CMS intact. Pain managed with scheduled Tylenol, PRN oral dilaudid, Robaxin,and cold packs. Dressing is C/D/I, MANDIE drain in place and intact. MANDIE dressing has dry drainage. Up with SBA.

## 2024-10-05 NOTE — PROGRESS NOTES
"[facility] Progress Note      Post-operative Day: 2 Days Post-Op    Procedure(s):  BILATERAL LUMBAR 4-5 TRANSFORAMINAL LUMBAR INTERBODY FUSION WITH BILATERAL LUMBAR 4-5 LAMINECTOMY AND DECOMPRESSION WITH STEALTH NAVIGATION AND ALLOGRAFT AND AUTOGRAFT      Subjective:  Doing well, pain well managed and primarily incisional. HA resolved. Voiding appropriately and passing gas. Denies new radicular pain, weakness, numbness.     Pain: minimal  Chest pain, SOB:  No      Objective:  Blood pressure 136/76, pulse 81, temperature 97.4  F (36.3  C), temperature source Oral, resp. rate 16, height 1.575 m (5' 2\"), weight 108 kg (238 lb), SpO2 95%, not currently breastfeeding.    Patient Vitals for the past 24 hrs:   BP Temp Temp src Pulse Resp SpO2   10/05/24 0740 136/76 97.4  F (36.3  C) Oral 81 16 95 %   10/04/24 2354 131/78 98.2  F (36.8  C) Oral 81 18 97 %   10/04/24 1547 120/70 98.1  F (36.7  C) Oral 80 18 97 %       Wt Readings from Last 4 Encounters:   09/30/24 108 kg (238 lb)   09/17/24 108.4 kg (239 lb)   07/05/24 108 kg (238 lb 1.6 oz)   05/22/24 108.9 kg (240 lb)         Motor function, sensation, and circulation intact   Yes  Wound status: incisions are clean dry and intact. Yes  Calf tenderness: Bilateral  No    Pertinent Labs   Lab Results: personally reviewed.     Recent Labs   Lab Test 10/05/24  0617 10/04/24  0544 10/03/24  1644 09/17/24  1439 05/23/24  0538 05/02/24  1623 03/05/24  1806   WBC  --   --   --  7.8  --  9.7 9.6   HGB 10.2* 10.9* 11.9 12.7   < > 12.5 13.4   HCT  --   --   --  39.2  --  38.5 40.6   MCV  --   --   --  91  --  93 92   PLT  --   --   --  290  --  296 321   NA  --   --   --   --   --   --  138    < > = values in this interval not displayed.       Plan: Anticoagulation protocol: Mechanical and/or ambulation              Pain medications:  scopainmedication: dilaudid, tylenol, and vistaril, robaxin, ice   Drain: Okay to remove            Weight bearing status:  WBAT, no " lifting >5lbs, avoid bending/twisting. LSO when OOB            Disposition:  Home today             Continue cares and rehabilitation     Report completed by:  Mara Parra PA-C  Date: 10/5/2024  Time: 8:58 AM

## 2024-10-07 ENCOUNTER — PATIENT OUTREACH (OUTPATIENT)
Dept: CARE COORDINATION | Facility: CLINIC | Age: 54
End: 2024-10-07
Payer: COMMERCIAL

## 2024-10-07 NOTE — PROGRESS NOTES
Clinic Care Coordination Contact  Care Coordination Clinician Chart Review    Situation: Patient chart reviewed by care coordinator.    Background: Clinic Care Coordination Referral received from inpatient care team for transition handoff communication following hospital admission.    Assessment: Upon chart review, patient is not a candidate for Primary Care Clinic Care Coordination enrollment due to reason stated below:  Primary Care Clinic Care Coordination will defer follow-up outreach to Specialty Clinic Care Coordination team who are already closely following patient.    Plan/Recommendations: Clinic Care Coordination Referral/order cancelled. RN/SW CC will perform no further monitoring/outreaches at this time and will remain available as needed. If new needs arise, a new Care Coordination Referral may be placed.

## 2024-10-23 ENCOUNTER — TRANSFERRED RECORDS (OUTPATIENT)
Dept: HEALTH INFORMATION MANAGEMENT | Facility: CLINIC | Age: 54
End: 2024-10-23
Payer: COMMERCIAL

## 2024-11-13 ENCOUNTER — TRANSFERRED RECORDS (OUTPATIENT)
Dept: HEALTH INFORMATION MANAGEMENT | Facility: CLINIC | Age: 54
End: 2024-11-13
Payer: COMMERCIAL

## 2025-01-31 ENCOUNTER — ANCILLARY PROCEDURE (OUTPATIENT)
Dept: GENERAL RADIOLOGY | Facility: CLINIC | Age: 55
End: 2025-01-31
Attending: STUDENT IN AN ORGANIZED HEALTH CARE EDUCATION/TRAINING PROGRAM
Payer: COMMERCIAL

## 2025-01-31 DIAGNOSIS — M25.522 LEFT ELBOW PAIN: ICD-10-CM

## 2025-01-31 PROCEDURE — 73080 X-RAY EXAM OF ELBOW: CPT | Mod: TC | Performed by: RADIOLOGY

## 2025-03-27 ENCOUNTER — PATIENT OUTREACH (OUTPATIENT)
Dept: GASTROENTEROLOGY | Facility: CLINIC | Age: 55
End: 2025-03-27
Payer: COMMERCIAL

## 2025-04-13 SDOH — HEALTH STABILITY: PHYSICAL HEALTH: ON AVERAGE, HOW MANY MINUTES DO YOU ENGAGE IN EXERCISE AT THIS LEVEL?: 30 MIN

## 2025-04-13 SDOH — HEALTH STABILITY: PHYSICAL HEALTH: ON AVERAGE, HOW MANY DAYS PER WEEK DO YOU ENGAGE IN MODERATE TO STRENUOUS EXERCISE (LIKE A BRISK WALK)?: 3 DAYS

## 2025-04-13 ASSESSMENT — SOCIAL DETERMINANTS OF HEALTH (SDOH): HOW OFTEN DO YOU GET TOGETHER WITH FRIENDS OR RELATIVES?: TWICE A WEEK

## 2025-04-15 ENCOUNTER — OFFICE VISIT (OUTPATIENT)
Dept: FAMILY MEDICINE | Facility: CLINIC | Age: 55
End: 2025-04-15
Payer: COMMERCIAL

## 2025-04-15 VITALS
TEMPERATURE: 97.5 F | OXYGEN SATURATION: 98 % | SYSTOLIC BLOOD PRESSURE: 153 MMHG | HEIGHT: 64 IN | RESPIRATION RATE: 14 BRPM | WEIGHT: 247 LBS | DIASTOLIC BLOOD PRESSURE: 96 MMHG | BODY MASS INDEX: 42.17 KG/M2 | HEART RATE: 74 BPM

## 2025-04-15 DIAGNOSIS — Z12.31 ENCOUNTER FOR SCREENING MAMMOGRAM FOR BREAST CANCER: ICD-10-CM

## 2025-04-15 DIAGNOSIS — R03.0 ELEVATED BLOOD PRESSURE READING IN OFFICE WITHOUT DIAGNOSIS OF HYPERTENSION: ICD-10-CM

## 2025-04-15 DIAGNOSIS — Z13.220 SCREENING FOR LIPID DISORDERS: ICD-10-CM

## 2025-04-15 DIAGNOSIS — Z00.00 ROUTINE GENERAL MEDICAL EXAMINATION AT A HEALTH CARE FACILITY: Primary | ICD-10-CM

## 2025-04-15 DIAGNOSIS — M48.062 SPINAL STENOSIS OF LUMBAR REGION WITH NEUROGENIC CLAUDICATION: ICD-10-CM

## 2025-04-15 DIAGNOSIS — Z13.1 SCREENING FOR DIABETES MELLITUS: ICD-10-CM

## 2025-04-15 DIAGNOSIS — E66.813 CLASS 3 SEVERE OBESITY DUE TO EXCESS CALORIES WITH SERIOUS COMORBIDITY AND BODY MASS INDEX (BMI) OF 40.0 TO 44.9 IN ADULT: ICD-10-CM

## 2025-04-15 DIAGNOSIS — Z13.29 SCREENING FOR THYROID DISORDER: ICD-10-CM

## 2025-04-15 DIAGNOSIS — Z12.4 CERVICAL CANCER SCREENING: ICD-10-CM

## 2025-04-15 PROBLEM — Z01.818 PREOP GENERAL PHYSICAL EXAM: Status: RESOLVED | Noted: 2024-05-02 | Resolved: 2025-04-15

## 2025-04-15 PROBLEM — M48.061 SPINAL STENOSIS AT L4-L5 LEVEL: Status: RESOLVED | Noted: 2024-10-03 | Resolved: 2025-04-15

## 2025-04-15 LAB
CHOLEST SERPL-MCNC: 194 MG/DL
EST. AVERAGE GLUCOSE BLD GHB EST-MCNC: 117 MG/DL
FASTING STATUS PATIENT QL REPORTED: YES
HBA1C MFR BLD: 5.7 % (ref 0–5.6)
HDLC SERPL-MCNC: 58 MG/DL
LDLC SERPL CALC-MCNC: 121 MG/DL
NONHDLC SERPL-MCNC: 136 MG/DL
TRIGL SERPL-MCNC: 73 MG/DL
TSH SERPL DL<=0.005 MIU/L-ACNC: 2.84 UIU/ML (ref 0.3–4.2)

## 2025-04-15 PROCEDURE — 90471 IMMUNIZATION ADMIN: CPT

## 2025-04-15 PROCEDURE — 99396 PREV VISIT EST AGE 40-64: CPT | Mod: 25

## 2025-04-15 PROCEDURE — 83036 HEMOGLOBIN GLYCOSYLATED A1C: CPT

## 2025-04-15 PROCEDURE — 36415 COLL VENOUS BLD VENIPUNCTURE: CPT

## 2025-04-15 PROCEDURE — 84443 ASSAY THYROID STIM HORMONE: CPT

## 2025-04-15 PROCEDURE — G2211 COMPLEX E/M VISIT ADD ON: HCPCS

## 2025-04-15 PROCEDURE — 99213 OFFICE O/P EST LOW 20 MIN: CPT | Mod: 25

## 2025-04-15 PROCEDURE — 80061 LIPID PANEL: CPT

## 2025-04-15 PROCEDURE — 87624 HPV HI-RISK TYP POOLED RSLT: CPT

## 2025-04-15 PROCEDURE — 90677 PCV20 VACCINE IM: CPT

## 2025-04-15 NOTE — ASSESSMENT & PLAN NOTE
Annual exam.  Mammogram: Due. Ordered  PAP:Updated today.  Colonoscopy: UTD.  Immunizations: PCV20 today. Standing orders for Zoster placed as patient would like to time this.  Labs: Discussed and offered.  Mood: No concerns  BMI: As documented.  Bone health: DEXA not indicated. Discussed bone health principles  Recommend follow up in one year for annual exam or sooner if needed/indicated elsewhere.

## 2025-04-15 NOTE — PROGRESS NOTES
Preventive Care Visit  [facility]  BRITTANI Lewis CNP, Family Medicine  Apr 15, 2025      Assessment & Plan   Assessment & Plan  Routine general medical examination at a health care facility  Annual exam.  Mammogram: Due. Ordered  PAP:Updated today.  Colonoscopy: UTD.  Immunizations: PCV20 today. Standing orders for Zoster placed as patient would like to time this.  Labs: Discussed and offered.  Mood: No concerns  BMI: As documented.  Bone health: DEXA not indicated. Discussed bone health principles  Recommend follow up in one year for annual exam or sooner if needed/indicated elsewhere.       Class 3 severe obesity due to excess calories with serious comorbidity and body mass index (BMI) of 40.0 to 44.9 in adult (H)  BMI today is 42.40. With recent surgery and return to work, patient reports exercise and diet have 'fallen off.' She plans to retire in 8 months and is now much improved from a pain persepctive with her back, therefore we discussed goals for reducing weight and improving lifestyle. I would strongly encourage her to add on resistance training. Elevated BP as documented elsewhere likely a consequence of recent lifestyle. See documentation elsewhere regarding that plan. She is interested in a medical weight management consultation and this order was placed today.   Orders:    Adult Comprehensive Weight Management  Referral; Future    Elevated blood pressure reading in office without diagnosis of hypertension  Elevated x2 today in clinic. History of sporadic elevations however most visits show BP within goal. With her report of more sedentary lifestyle and weight gain, suspicious of this being the cause. BP has responded well to weight loss historically. We discussed metabolic health and strategies there. We also discussed the need to monitor closely and I have asked she update me in 3 months. If still elevated at that time and/or she has been unsuccessful in  "weight loss, we will need to consider anti-hypertensives.       Screening for lipid disorders    Orders:    Lipid panel reflex to direct LDL Fasting; Future    Screening for diabetes mellitus    Orders:    Hemoglobin A1c; Future    Screening for thyroid disorder    Orders:    TSH with free T4 reflex; Future    Encounter for screening mammogram for breast cancer    Orders:    MA Screen Bilateral w/Telly; Future    Cervical cancer screening    Orders:    HPV and Gynecologic Cytology Panel - Recommended Age 30-65 Years    Gynecologic Cytology (PAP)    Spinal stenosis of lumbar region with neurogenic claudication  Patient underwent successful fusion, laminectomy and decompression late last year. Reports recovery was challenging but feels quite improved from a pain perspective now that she is 6 months out.            Patient has been advised of split billing requirements and indicates understanding: Yes       BMI  Estimated body mass index is 42.4 kg/m  as calculated from the following:    Height as of this encounter: 1.626 m (5' 4\").    Weight as of this encounter: 112 kg (247 lb).   Weight management plan: Patient referred to endocrine and/or weight management specialty Discussed healthy diet and exercise guidelines    Counseling  Appropriate preventive services were addressed with this patient via screening, questionnaire, or discussion as appropriate for fall prevention, nutrition, physical activity, Tobacco-use cessation, social engagement, weight loss and cognition.  Checklist reviewing preventive services available has been given to the patient.  Reviewed patient's diet, addressing concerns and/or questions.   She is at risk for lack of exercise and has been provided with information to increase physical activity for the benefit of her well-being.     Razia Sierra is a 54 year old, presenting for the following:  Physical        4/15/2025     7:37 AM   Additional Questions   Roomed by as   Accompanied by " self         4/15/2025     7:37 AM   Patient Reported Additional Medications   Patient reports taking the following new medications no      HPI    Advance Care Planning  Patient does not have a Health Care Directive: Discussed advance care planning with patient; however, patient declined at this time.      4/13/2025   General Health   How would you rate your overall physical health? (!) FAIR   Feel stress (tense, anxious, or unable to sleep) Not at all         4/13/2025   Nutrition   Three or more servings of calcium each day? (!) NO   Diet: Regular (no restrictions)   How many servings of fruit and vegetables per day? (!) 2-3   How many sweetened beverages each day? 0-1         4/13/2025   Exercise   Days per week of moderate/strenous exercise 3 days   Average minutes spent exercising at this level 30 min         4/13/2025   Social Factors   Frequency of gathering with friends or relatives Twice a week   Worry food won't last until get money to buy more No   Food not last or not have enough money for food? No   Do you have housing? (Housing is defined as stable permanent housing and does not include staying ouside in a car, in a tent, in an abandoned building, in an overnight shelter, or couch-surfing.) Yes   Are you worried about losing your housing? No   Lack of transportation? No   Unable to get utilities (heat,electricity)? No         4/13/2025   Fall Risk   Fallen 2 or more times in the past year? No   Trouble with walking or balance? No          4/13/2025   Dental   Dentist two times every year? Yes           3/23/2024   TB Screening   Were you born outside of the [place]? No             Today's PHQ-2 Score:       1/31/2025     9:42 AM   PHQ-2 ( 1999 [employer])   Q1: Little interest or pleasure in doing things 0   Q2: Feeling down, depressed or hopeless 0   PHQ-2 Score 0         4/13/2025   Substance Use   Alcohol more than 3/day or more than 7/wk No   Do you use any other substances recreationally? (!) CANNABIS  "PRODUCTS     Social History     Tobacco Use    Smoking status: Never     Passive exposure: Never    Smokeless tobacco: Never    Tobacco comments:     Quit javed before knee surgery    Vaping Use    Vaping status: Never Used   Substance Use Topics    Alcohol use: Yes     Comment: 4-10 drinks per week    Drug use: Never     Types: Marijuana           1/22/2024   LAST FHS-7 RESULTS   1st degree relative breast or ovarian cancer No   Any relative bilateral breast cancer No   Any male have breast cancer No   Any ONE woman have BOTH breast AND ovarian cancer No   Any woman with breast cancer before 50yrs No   2 or more relatives with breast AND/OR ovarian cancer No   2 or more relatives with breast AND/OR bowel cancer Yes        Mammogram Screening - Mammogram every 1-2 years updated in Health Maintenance based on mutual decision making        4/13/2025   STI Screening   New sexual partner(s) since last STI/HIV test? No     History of abnormal Pap smear: No - age 30- 64 PAP with HPV every 5 years recommended       ASCVD Risk   The 10-year ASCVD risk score (Wendi DUNCAN, et al., 2019) is: 2.3%    Values used to calculate the score:      Age: 54 years      Sex: Female      Is Non- : No      Diabetic: No      Tobacco smoker: No      Systolic Blood Pressure: 153 mmHg      Is BP treated: No      HDL Cholesterol: 58 mg/dL      Total Cholesterol: 194 mg/dL       Reviewed and updated as needed this visit by Provider   Tobacco  Allergies  Meds  Problems  Med Hx  Surg Hx  Fam Hx             Objective    Exam  BP (!) 153/96 (BP Location: Left arm, Patient Position: Sitting, Cuff Size: Adult Large)   Pulse 74   Temp 97.5  F (36.4  C) (Oral)   Resp 14   Ht 1.626 m (5' 4\")   Wt 112 kg (247 lb)   SpO2 98%   BMI 42.40 kg/m     Estimated body mass index is 42.4 kg/m  as calculated from the following:    Height as of this encounter: 1.626 m (5' 4\").    Weight as of this encounter: 112 kg " (247 lb).    Physical Exam  GENERAL: alert and no distress  EYES: Eyes grossly normal to inspection, PERRL and conjunctivae and sclerae normal  HENT: ear canals and TM's normal, nose and mouth without ulcers or lesions  NECK: no adenopathy, no asymmetry, masses, or scars  RESP: lungs clear to auscultation - no rales, rhonchi or wheezes  BREAST: normal without masses, tenderness or nipple discharge and no palpable axillary masses or adenopathy  CV: regular rate and rhythm, normal S1 S2, no S3 or S4, no murmur, click or rub, no peripheral edema  ABDOMEN: soft, nontender, no hepatosplenomegaly, no masses and bowel sounds normal   (female) w/bimanual: normal female external genitalia, normal urethral meatus, normal vaginal mucosa, and normal cervix/adnexa/uterus without masses or discharge  MS: no gross musculoskeletal defects noted, no edema  SKIN: no suspicious lesions or rashes  NEURO: Normal strength and tone, mentation intact and speech normal  PSYCH: mentation appears normal, affect normal/bright    Signed Electronically by: BRITTANI Lewis CNP

## 2025-04-15 NOTE — ASSESSMENT & PLAN NOTE
Patient underwent successful fusion, laminectomy and decompression late last year. Reports recovery was challenging but feels quite improved from a pain perspective now that she is 6 months out.

## 2025-04-15 NOTE — PATIENT INSTRUCTIONS
Patient Education   Preventive Care Advice   This is general advice given by our system to help you stay healthy. However, your care team may have specific advice just for you. Please talk to your care team about your preventive care needs.  Nutrition  Eat 5 or more servings of fruits and vegetables each day.  Try wheat bread, brown rice and whole grain pasta (instead of white bread, rice, and pasta).  Get enough calcium and vitamin D. Check the label on foods and aim for 100% of the RDA (recommended daily allowance).  Lifestyle  Exercise at least 150 minutes each week  (30 minutes a day, 5 days a week).  Do muscle strengthening activities 2 days a week. These help control your weight and prevent disease.  No smoking.  Wear sunscreen to prevent skin cancer.  Have a dental exam and cleaning every 6 months.  Yearly exams  See your health care team every year to talk about:  Any changes in your health.  Any medicines your care team has prescribed.  Preventive care, family planning, and ways to prevent chronic diseases.  Shots (vaccines)   HPV shots (up to age 26), if you've never had them before.  Hepatitis B shots (up to age 59), if you've never had them before.  COVID-19 shot: Get this shot when it's due.  Flu shot: Get a flu shot every year.  Tetanus shot: Get a tetanus shot every 10 years.  Pneumococcal, hepatitis A, and RSV shots: Ask your care team if you need these based on your risk.  Shingles shot (for age 50 and up)  General health tests  Diabetes screening:  Starting at age 35, Get screened for diabetes at least every 3 years.  If you are younger than age 35, ask your care team if you should be screened for diabetes.  Cholesterol test: At age 39, start having a cholesterol test every 5 years, or more often if advised.  Bone density scan (DEXA): At age 50, ask your care team if you should have this scan for osteoporosis (brittle bones).  Hepatitis C: Get tested at least once in your life.  STIs (sexually  transmitted infections)  Before age 24: Ask your care team if you should be screened for STIs.  After age 24: Get screened for STIs if you're at risk. You are at risk for STIs (including HIV) if:  You are sexually active with more than one person.  You don't use condoms every time.  You or a partner was diagnosed with a sexually transmitted infection.  If you are at risk for HIV, ask about PrEP medicine to prevent HIV.  Get tested for HIV at least once in your life, whether you are at risk for HIV or not.  Cancer screening tests  Cervical cancer screening: If you have a cervix, begin getting regular cervical cancer screening tests starting at age 21.  Breast cancer scan (mammogram): If you've ever had breasts, begin having regular mammograms starting at age 40. This is a scan to check for breast cancer.  Colon cancer screening: It is important to start screening for colon cancer at age 45.  Have a colonoscopy test every 10 years (or more often if you're at risk) Or, ask your provider about stool tests like a FIT test every year or Cologuard test every 3 years.  To learn more about your testing options, visit:   .  For help making a decision, visit:   https://bit.ly/og24273.  Prostate cancer screening test: If you have a prostate, ask your care team if a prostate cancer screening test (PSA) at age 55 is right for you.  Lung cancer screening: If you are a current or former smoker ages 50 to 80, ask your care team if ongoing lung cancer screenings are right for you.  For informational purposes only. Not to replace the advice of your health care provider. Copyright   2023 [employer]. All rights reserved. Clinically reviewed by the [facility]. [employer] 134262 - REV 01/24.

## 2025-04-16 LAB
HPV HR 12 DNA CVX QL NAA+PROBE: NEGATIVE
HPV16 DNA CVX QL NAA+PROBE: NEGATIVE
HPV18 DNA CVX QL NAA+PROBE: NEGATIVE
HUMAN PAPILLOMA VIRUS FINAL DIAGNOSIS: NORMAL

## 2025-04-21 ENCOUNTER — HOSPITAL ENCOUNTER (OUTPATIENT)
Dept: MAMMOGRAPHY | Facility: CLINIC | Age: 55
Discharge: HOME | End: 2025-04-21
Payer: COMMERCIAL

## 2025-04-21 DIAGNOSIS — Z12.31 ENCOUNTER FOR SCREENING MAMMOGRAM FOR BREAST CANCER: ICD-10-CM

## 2025-04-21 PROCEDURE — 77063 BREAST TOMOSYNTHESIS BI: CPT

## (undated) DEVICE — GLOVE SURG PI ULTRA TOUCH M SZ 7-1/2 LF

## (undated) DEVICE — SUTURE VICRYL+ 1 27IN CT-1 UND VCP261H

## (undated) DEVICE — SPONGE KITNER DISSECTING 7102*

## (undated) DEVICE — GLOVE BIOGEL PI ULTRATOUCH G SZ 7.5 42175

## (undated) DEVICE — DRAPE STERI TOWEL LG 1010

## (undated) DEVICE — SOL WATER IRRIG 1000ML BOTTLE 2F7114

## (undated) DEVICE — SUTURE VICRYL+ 2-0 18 CT1/CR VLT VCP839D

## (undated) DEVICE — CUSHION INSERT LG PRONE VIEW JACKSON TABLE

## (undated) DEVICE — ELECTRODE PATIENT RETURN ADULT L10 FT 2 PLATE CORD 0855C

## (undated) DEVICE — SU SILK 2-0 FS-1 18" 685G

## (undated) DEVICE — PACK MINOR SINGLE BASIN SSK3001

## (undated) DEVICE — DRSG TELFA 3X8" 1238

## (undated) DEVICE — SPONGE RAY-TEC 4X8" 7318

## (undated) DEVICE — TOOL DISSECT MIDAS MR8 14CM MATCH HEAD 3MM MR8-14MH30

## (undated) DEVICE — GLOVE BIOGEL INDICATOR 7.5 LF 41675

## (undated) DEVICE — DRSG STERI STRIP 1X5" R1548

## (undated) DEVICE — CUSTOM PACK TOTAL KNEE SOP5BTKHEC

## (undated) DEVICE — SOL NACL 0.9% INJ 500ML BAG 2B1323Q

## (undated) DEVICE — MARKER SPHERES PASSIVE MEDT PACK 5 8801075

## (undated) DEVICE — SU STRATAFIX MONOCRYL 3-0 SPIRAL PS-2 30CM SXMP1B106

## (undated) DEVICE — HOOD SURG T7 PLUS STRL LF DISP 0416-801-200

## (undated) DEVICE — ESU PENCIL SMOKE EVAC W/ROCKER SWITCH 0703-047-000

## (undated) DEVICE — PACK 9X6IN THRP HOT COLD CMPR  MED GEL 80104

## (undated) DEVICE — DRAPE IOBAN INCISE 23X17" 6650EZ

## (undated) DEVICE — GLOVE BIOGEL PI INDICATOR 8.0 LF 41680

## (undated) DEVICE — BLADE SAW SAGITTAL STRK DUAL CUT 4118-135-090

## (undated) DEVICE — POSITIONER ARM CRADLE LAMINECTOMY DISP

## (undated) DEVICE — SUCTION TIP POOLE STERILE 35040

## (undated) DEVICE — Device

## (undated) DEVICE — DRAIN RESERVOIR 100ML JP 0070740

## (undated) DEVICE — WRAP B-COOL TERI LUMBAR 08143380

## (undated) DEVICE — ESU BIPOLAR SEALER AQUAMANTYS 6MM 23-112-1

## (undated) DEVICE — CATH TRAY FOLEY SURESTEP 16FR DRAIN BAG STATOCK A899916

## (undated) DEVICE — GLOVE UNDER INDICATOR PI SZ 7.0 LF 41670

## (undated) DEVICE — DRAPE SHEET REV FOLD 3/4 9349

## (undated) DEVICE — DRAPE C-ARM 60X42" 1013

## (undated) DEVICE — DRILL PIN HEADLESS TROCAR 00-5901-020-00

## (undated) DEVICE — SUTURE VICRYL+ 1 18 CT/CR  VLT VCP753D

## (undated) DEVICE — HOLDER LIMB VELCRO OR 0814-1533

## (undated) DEVICE — PROBE M5 SINGLE USE

## (undated) DEVICE — CUSTOM PACK TOTAL KNEE ACCESSORY SOP5BTAHEA

## (undated) DEVICE — SUTURE MONOCRYL+ 2-0 27IN CT2 MCP333H

## (undated) DEVICE — IMPLANTABLE DEVICE: Type: IMPLANTABLE DEVICE | Site: KNEE | Status: NON-FUNCTIONAL

## (undated) DEVICE — DECANTER VIAL 2006S

## (undated) DEVICE — SOL NACL 0.9% INJ 1000ML BAG 2B1324X

## (undated) DEVICE — SU STRATAFIX PDS PLUS 1 CT-1 18" SXPP1A404

## (undated) DEVICE — TRAP SPECIMAN 5CC-40CC DYND44140

## (undated) DEVICE — BLADE BONE MILL STRK 5.0MM MED 5400-701-000

## (undated) DEVICE — SOL NACL 0.9% IRRIG 1000ML BOTTLE 2F7124

## (undated) DEVICE — GLOVE BIOGEL PI ULTRATOUCH G SZ 7.0 42170

## (undated) DEVICE — CAST PADDING 6IN COTTON WEBRIL STERILE 2554

## (undated) DEVICE — CUSTOM PACK LUMBAR FUSION SNE5BLFHEA

## (undated) DEVICE — CATH IV 14GA 2IN REM FLASHPLUG DEHP-FR PVC FR 4251717-02

## (undated) DEVICE — SUCTION MANIFOLD NEPTUNE 2 SYS 4 PORT 0702-020-000

## (undated) DEVICE — DRAPE BACK TABLE PADDED 60X90

## (undated) DEVICE — DRSG AQUACEL AG HYDROFIBER  3.5X10" 422605

## (undated) DEVICE — GLOVE BIOGEL PI SZ 8.0 40880

## (undated) DEVICE — HOOD SURG T7PLUS PEEL AWAY FACE SHIELD STRL LF 0416-801-100

## (undated) DEVICE — SUCTION MANIFOLD NEPTUNE 2 SYS 1 PORT 702-025-000

## (undated) DEVICE — RX SURGIFLO HEMOSTATIC MATRIX 8ML 2991

## (undated) RX ORDER — TRANEXAMIC ACID 100 MG/ML
INJECTION, SOLUTION INTRAVENOUS
Status: DISPENSED
Start: 2024-10-03

## (undated) RX ORDER — PHENYLEPHRINE HYDROCHLORIDE 10 MG/ML
INJECTION INTRAVENOUS
Status: DISPENSED
Start: 2024-05-22

## (undated) RX ORDER — FENTANYL CITRATE 50 UG/ML
INJECTION, SOLUTION INTRAMUSCULAR; INTRAVENOUS
Status: DISPENSED
Start: 2024-10-03

## (undated) RX ORDER — PROPOFOL 10 MG/ML
INJECTION, EMULSION INTRAVENOUS
Status: DISPENSED
Start: 2024-10-03

## (undated) RX ORDER — ONDANSETRON 2 MG/ML
INJECTION INTRAMUSCULAR; INTRAVENOUS
Status: DISPENSED
Start: 2024-05-22

## (undated) RX ORDER — LIDOCAINE HYDROCHLORIDE 10 MG/ML
INJECTION, SOLUTION EPIDURAL; INFILTRATION; INTRACAUDAL; PERINEURAL
Status: DISPENSED
Start: 2024-05-22

## (undated) RX ORDER — DEXAMETHASONE SODIUM PHOSPHATE 10 MG/ML
INJECTION, EMULSION INTRAMUSCULAR; INTRAVENOUS
Status: DISPENSED
Start: 2024-10-03

## (undated) RX ORDER — DEXAMETHASONE SODIUM PHOSPHATE 10 MG/ML
INJECTION, EMULSION INTRAMUSCULAR; INTRAVENOUS
Status: DISPENSED
Start: 2024-05-22

## (undated) RX ORDER — PROPOFOL 10 MG/ML
INJECTION, EMULSION INTRAVENOUS
Status: DISPENSED
Start: 2024-05-22

## (undated) RX ORDER — FENTANYL CITRATE-0.9 % NACL/PF 10 MCG/ML
PLASTIC BAG, INJECTION (ML) INTRAVENOUS
Status: DISPENSED
Start: 2024-05-22